# Patient Record
Sex: MALE | NOT HISPANIC OR LATINO | Employment: FULL TIME | URBAN - METROPOLITAN AREA
[De-identification: names, ages, dates, MRNs, and addresses within clinical notes are randomized per-mention and may not be internally consistent; named-entity substitution may affect disease eponyms.]

---

## 2022-05-23 ENCOUNTER — TRANSFERRED RECORDS (OUTPATIENT)
Dept: HEALTH INFORMATION MANAGEMENT | Facility: CLINIC | Age: 32
End: 2022-05-23

## 2023-04-10 ENCOUNTER — OFFICE VISIT (OUTPATIENT)
Dept: INTERNAL MEDICINE | Facility: CLINIC | Age: 33
End: 2023-04-10
Payer: COMMERCIAL

## 2023-04-10 VITALS
HEART RATE: 95 BPM | HEIGHT: 72 IN | SYSTOLIC BLOOD PRESSURE: 128 MMHG | OXYGEN SATURATION: 99 % | WEIGHT: 195.8 LBS | BODY MASS INDEX: 26.52 KG/M2 | DIASTOLIC BLOOD PRESSURE: 84 MMHG

## 2023-04-10 DIAGNOSIS — I10 PRIMARY HYPERTENSION: ICD-10-CM

## 2023-04-10 DIAGNOSIS — E78.5 DYSLIPIDEMIA: Primary | ICD-10-CM

## 2023-04-10 DIAGNOSIS — F17.200 NICOTINE DEPENDENCE, UNCOMPLICATED, UNSPECIFIED NICOTINE PRODUCT TYPE: ICD-10-CM

## 2023-04-10 PROBLEM — F90.9 ADHD (ATTENTION DEFICIT HYPERACTIVITY DISORDER): Status: ACTIVE | Noted: 2018-01-01

## 2023-04-10 PROCEDURE — 99204 OFFICE O/P NEW MOD 45 MIN: CPT | Performed by: INTERNAL MEDICINE

## 2023-04-10 RX ORDER — ROSUVASTATIN CALCIUM 10 MG/1
TABLET, COATED ORAL
COMMUNITY
Start: 2023-03-01 | End: 2023-04-10

## 2023-04-10 RX ORDER — AMLODIPINE BESYLATE AND ATORVASTATIN CALCIUM 10; 20 MG/1; MG/1
TABLET, FILM COATED ORAL
COMMUNITY
Start: 2023-03-01 | End: 2023-04-10

## 2023-04-10 RX ORDER — AMLODIPINE BESYLATE 10 MG/1
10 TABLET ORAL DAILY
Qty: 90 TABLET | Refills: 3 | Status: SHIPPED | OUTPATIENT
Start: 2023-04-10 | End: 2024-06-24

## 2023-04-10 RX ORDER — NICOTINE 21 MG/24HR
1 PATCH, TRANSDERMAL 24 HOURS TRANSDERMAL EVERY 24 HOURS
Qty: 42 PATCH | Refills: 0 | Status: SHIPPED | OUTPATIENT
Start: 2023-04-10 | End: 2023-05-22

## 2023-04-10 RX ORDER — VENLAFAXINE HYDROCHLORIDE 150 MG/1
150 CAPSULE, EXTENDED RELEASE ORAL DAILY
COMMUNITY
End: 2023-06-09

## 2023-04-10 RX ORDER — GUANFACINE 4 MG/1
TABLET, EXTENDED RELEASE ORAL
COMMUNITY
Start: 2023-01-01 | End: 2023-06-09

## 2023-04-10 RX ORDER — DEXTROAMPHETAMINE SACCHARATE, AMPHETAMINE ASPARTATE MONOHYDRATE, DEXTROAMPHETAMINE SULFATE AND AMPHETAMINE SULFATE 5; 5; 5; 5 MG/1; MG/1; MG/1; MG/1
CAPSULE, EXTENDED RELEASE ORAL
COMMUNITY
Start: 2023-01-01 | End: 2023-05-23 | Stop reason: ALTCHOICE

## 2023-04-10 RX ORDER — DEXTROAMPHETAMINE SACCHARATE, AMPHETAMINE ASPARTATE, DEXTROAMPHETAMINE SULFATE AND AMPHETAMINE SULFATE 2.5; 2.5; 2.5; 2.5 MG/1; MG/1; MG/1; MG/1
10 TABLET ORAL DAILY
COMMUNITY
Start: 2021-11-01 | End: 2023-05-23 | Stop reason: ALTCHOICE

## 2023-04-10 RX ORDER — NICOTINE 21 MG/24HR
1 PATCH, TRANSDERMAL 24 HOURS TRANSDERMAL EVERY 24 HOURS
Qty: 14 PATCH | Refills: 0 | Status: SHIPPED | OUTPATIENT
Start: 2023-05-22 | End: 2023-06-05

## 2023-04-10 RX ORDER — VENLAFAXINE HYDROCHLORIDE 37.5 MG/1
37.5 CAPSULE, EXTENDED RELEASE ORAL DAILY
COMMUNITY
End: 2023-06-09

## 2023-04-10 RX ORDER — AMLODIPINE BESYLATE 10 MG/1
10 TABLET ORAL DAILY
COMMUNITY
End: 2023-04-10

## 2023-04-10 RX ORDER — ROSUVASTATIN CALCIUM 10 MG/1
10 TABLET, COATED ORAL DAILY
Qty: 90 TABLET | Refills: 0 | Status: SHIPPED | OUTPATIENT
Start: 2023-04-10 | End: 2023-07-20

## 2023-04-10 ASSESSMENT — ENCOUNTER SYMPTOMS
INSOMNIA: 0
DEPRESSION: 1
NERVOUS/ANXIOUS: 0
DECREASED CONCENTRATION: 1
LIGHT-HEADEDNESS: 1
HYPERTENSION: 1
PANIC: 0

## 2023-04-10 NOTE — PATIENT INSTRUCTIONS
Nicotine Transdermal System   Habitrol, Nicoderm C-Q    Uses  For quitting smoking.    Instructions  DO NOT take this medicine by mouth.    Avoid placing the patch near the breast.    Remove the patch after 24 hours.    Keep the medicine at room temperature. Avoid heat and direct light.    This patch should not be cut.    Wash your hands before and after handling this medicine.    Remove old patch before applying new one. Change the location of the new patch.    If you have vivid dreams or trouble sleeping, you may remove the patch before going to sleep.    Ask your doctor or pharmacist about locations on your body where this patch can be used.    Remove the plastic liner that protects the sticky side of the patch before applying to the skin.    Be sure the area of skin is clean and dry before putting on a new patch.    Apply the patch to a clean, dry, hairless area.    Press the patch firmly for a few seconds to make sure it stays in place.    After removing the patch, fold it together and discard it out of reach of children and pets.    Please ask your doctor or pharmacist how you can safely dispose of used patches.    If the skin under the patch becomes irritated, remove the patch. Do not apply a new patch to the area until the skin feels better.    To avoid irritating your skin, use a different location for a new patch.    Apply the patch only to normal looking skin. Avoid areas of the skin that are red, have scrapes, or damaged.    If the patch falls off, apply a new a patch on a different location of the body.    Please tell your doctor and pharmacist about all the medicines you take. Include both prescription and over-the-counter medicines. Also tell them about any vitamins, herbal medicines, or anything else you take for your health.    If you need to stop this medicine, your doctor may wish to gradually reduce the dosage before stopping.    Do not use more than 1 patch at any one  time.    Cautions  Tell your doctor and pharmacist if you ever had an allergic reaction to a medicine. Symptoms of an allergic reaction can include trouble breathing, skin rash, itching, swelling, or severe dizziness.    Do not use the medication any more than instructed.    Avoid smoking while on this medicine. Smoking may increase your risk for stroke, heart attack, blood clots, high blood pressure, and other diseases of the heart and blood vessels.    Tell the doctor or pharmacist if you are pregnant, planning to be pregnant, or breastfeeding.    Ask your pharmacist if this medicine can interact with any of your other medicines. Be sure to tell them about all the medicines you take.    Please tell all your doctors and dentists that you are on this medicine before they provide care.    Side Effects  The following is a list of some common side effects from this medicine. Please speak with your doctor about what you should do if you experience these or other side effects.      skin irritation where medicine is applied    If you have any of the following side effects, you may be getting too much medicine. Please contact your doctor to let them know about these side effects.      diarrhea    dizziness    nausea    rapid heartbeat    vomiting    A few people may have an allergic reaction to this medicine. Symptoms can include difficulty breathing, skin rash, itching, swelling, or severe dizziness. If you notice any of these symptoms, seek medical help quickly.    Extra  Please speak with your doctor, nurse, or pharmacist if you have any questions about this medicine.      [email or web address]  IMPORTANT NOTE: This document tells you briefly how to take your medicine, but it does not tell you all there is to know about it. Your doctor or pharmacist may give you other documents about your medicine. Please talk to them if you have any questions. Always follow their advice. There is a more  complete description of this medicine available in English. Scan this code on your smartphone or tablet or use the web address below. You can also ask your pharmacist for a printout. If you have any questions, please ask your pharmacist.   2021 [employer].      1728-4988 [employer]. All rights reserved. This information is not intended as a substitute for professional medical care. Always follow your healthcare professional's instructions.

## 2023-04-10 NOTE — PROGRESS NOTES
Assessment & Plan     Dyslipidemia    Jono just started rosuvastatin a few weeks ago- outside records suggest a 20mg dose, but he says he is taking 10mg.  We'll recheck lipids in a couple of months.  His prior PCP had placed a referral to cardiology due to his chest pain (though negative stress test last year, and symptoms improved now), HTN, and dyslipidemia but he was not able to see cardiology yet so requests a referral in our system.     - Adult Cardiology Eval  Referral; Future  - Lipid panel reflex to direct LDL Fasting; Future  - rosuvastatin (CRESTOR) 10 MG tablet; Take 1 tablet (10 mg) by mouth daily    Primary hypertension    BP looks okay in clinic today, but he reports home BPs have been higher.  He hasn't had his home machine checked against a clinic machine, so RN BP check appointment scheduled so this can be done.  Continue current amlodipine 10mg.  He is also working with psychiatry to taper down on Adderall and venlafaxine; has an appointment with a new provider next week.     - Adult Cardiology Eval  Referral; Future  - amLODIPine (NORVASC) 10 MG tablet; Take 1 tablet (10 mg) by mouth daily    Nicotine dependence, uncomplicated, unspecified nicotine product type    He switched from cigarettes to vaping in 2017 and would like to try patches again to try to quit vaping.  Discussed pneumonia vaccine and he was interested in getting this, but unfortunately the clinic vaccine fridge is currently out of commission, so can see if he can get the vaccine at the RN appointment.     - [place] Quit Partner Referral; Future  - nicotine (NICODERM CQ) 21 MG/24HR 24 hr patch; Place 1 patch onto the skin every 24 hours for 42 days  - nicotine (NICODERM CQ) 14 MG/24HR 24 hr patch; Place 1 patch onto the skin every 24 hours for 14 days  - nicotine (NICODERM CQ) 7 MG/24HR 24 hr patch; Place 1 patch onto the skin every 24 hours for 14 days    He reports having more COVID vaccine doses than we have on  file, so asked him to upload info to [employer] if he finds it.     Hep B vaccines done in childhood.     HIV and Hep C screening declined- low risk.        Nicotine/Tobacco Cessation:  He reports that he has been smoking vaping device and cigarettes. He started smoking about 14 years ago. He has been smoking an average of 1 pack per day. He has never used smokeless tobacco.  Nicotine/Tobacco Cessation Plan:   Pharmacotherapies : Nicotine patch      Jarret Centeno MD  [facility] [place] [facility]   Jono is a 33 year old, presenting for the following health issues:  Establish Care (Hypertension, needs cardiology referral, medication review)    HPI     Jono moved to [place] from [place] about a year to be closer to his SO's family.   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  He works as .     He has recently been seen in primary care at [facility] and has been started on amlodipine for HTN (lisinopril also ordered but not started since BP was doing okay), rosuvastatin 20mg started for dyslipidemia per the chart but he reports he is taking 10mg (started this 2-3 weeks ago), and referral to cardiology was placed due to high LDL and chest pain (though negative stress test last year).  Jono reports his father has a history of cardiac arrhythmia, no other heart disease in his family.      Jono reports that home BPs actually tend to be higher than in the office.  Recent BPs at home been 140/90s or under.      He is following with psychiatry to reduce venlafaxine (currently on 187.5mg daily down from 225mg daily) and Adderall and is scheduled to see a new provider next week.      [place] XR 20mg filled on 3/11 for 30 tablets and Adderall IR 10mg filled on 3/17 for 30 tablets by Tarik Castanon, MARISA in [place].      Jono is currently vaping, changed to this from cigarettes.  He has used  "nicotine patches in the past which were helpful.          Review of Systems   Constitutional, CV, psych negative, except as otherwise noted.      Objective    /84 (BP Location: Right arm, Patient Position: Sitting, Cuff Size: Adult Regular)   Pulse 95   Ht 1.832 m (6' 0.13\")   Wt 88.8 kg (195 lb 12.8 oz)   SpO2 99%   BMI 26.46 kg/m    Body mass index is 26.46 kg/m .  Physical Exam   GENERAL: healthy, alert and no distress  RESP: lungs clear to auscultation - no rales, rhonchi or wheezes  CV: regular rate and rhythm, normal S1 S2, no S3 or S4, no murmur, click or rub, no peripheral edema and peripheral pulses strong  PSYCH: mentation appears normal, affect normal/bright               Answers for HPI/ROS submitted by the patient on 4/10/2023  General Symptoms: No  Skin Symptoms: No  HENT Symptoms: No  EYE SYMPTOMS: No  HEART SYMPTOMS: Yes  LUNG SYMPTOMS: No  INTESTINAL SYMPTOMS: No  URINARY SYMPTOMS: No  REPRODUCTIVE SYMPTOMS: No  SKELETAL SYMPTOMS: No  BLOOD SYMPTOMS: No  NERVOUS SYSTEM SYMPTOMS: No  MENTAL HEALTH SYMPTOMS: Yes  Chest pain or pressure: Yes  High blood pressure: Yes  Light-headedness: Yes  Nervous or Anxious: No  Depression: Yes  Trouble sleeping: No  Trouble thinking or concentrating: Yes  Mood changes: Yes  Panic attacks: No      "

## 2023-05-21 ENCOUNTER — HEALTH MAINTENANCE LETTER (OUTPATIENT)
Age: 33
End: 2023-05-21

## 2023-05-23 ENCOUNTER — VIRTUAL VISIT (OUTPATIENT)
Dept: FAMILY MEDICINE | Facility: CLINIC | Age: 33
End: 2023-05-23
Payer: COMMERCIAL

## 2023-05-23 ENCOUNTER — TELEPHONE (OUTPATIENT)
Dept: INTERNAL MEDICINE | Facility: CLINIC | Age: 33
End: 2023-05-23

## 2023-05-23 DIAGNOSIS — F90.9 ATTENTION DEFICIT HYPERACTIVITY DISORDER (ADHD), UNSPECIFIED ADHD TYPE: Primary | ICD-10-CM

## 2023-05-23 DIAGNOSIS — F33.1 MODERATE RECURRENT MAJOR DEPRESSION (H): ICD-10-CM

## 2023-05-23 PROCEDURE — 99214 OFFICE O/P EST MOD 30 MIN: CPT | Mod: 95 | Performed by: FAMILY MEDICINE

## 2023-05-23 PROCEDURE — 96127 BRIEF EMOTIONAL/BEHAV ASSMT: CPT | Performed by: FAMILY MEDICINE

## 2023-05-23 RX ORDER — LISDEXAMFETAMINE DIMESYLATE 30 MG/1
30 CAPSULE ORAL EVERY MORNING
Qty: 30 CAPSULE | Refills: 0 | Status: SHIPPED | OUTPATIENT
Start: 2023-05-23 | End: 2023-06-24

## 2023-05-23 RX ORDER — LISDEXAMFETAMINE DIMESYLATE 30 MG/1
1 CAPSULE ORAL EVERY MORNING
COMMUNITY
Start: 2021-07-19 | End: 2023-05-23

## 2023-05-23 RX ORDER — GUANFACINE 4 MG/1
TABLET, EXTENDED RELEASE ORAL
Status: CANCELLED | OUTPATIENT
Start: 2023-05-23

## 2023-05-23 ASSESSMENT — PATIENT HEALTH QUESTIONNAIRE - PHQ9
SUM OF ALL RESPONSES TO PHQ QUESTIONS 1-9: 10
SUM OF ALL RESPONSES TO PHQ QUESTIONS 1-9: 10
10. IF YOU CHECKED OFF ANY PROBLEMS, HOW DIFFICULT HAVE THESE PROBLEMS MADE IT FOR YOU TO DO YOUR WORK, TAKE CARE OF THINGS AT HOME, OR GET ALONG WITH OTHER PEOPLE: SOMEWHAT DIFFICULT

## 2023-05-23 NOTE — TELEPHONE ENCOUNTER
Woody Alberto     Pt is having trouble logging on.  He said he was called to come on at 4 pm but I do not see any phone message.  He is unable to get on now and I have central scheduling on the line with pt.  What should I tell him?    ALCIRA Rice

## 2023-05-23 NOTE — PROGRESS NOTES
"Jono is a 33 year old who is being evaluated via a billable video visit.      How would you like to obtain your AVS? MyChart  If the video visit is dropped, the invitation should be resent by: Text to cell phone: [phone]  Will anyone else be joining your video visit? No          Assessment & Plan     Attention deficit hyperactivity disorder (ADHD), unspecified ADHD type  Management discussed with patient, has done well with Vyvanse in the past, will send a new prescription at 30 mg daily, possible side effect discussed included blood pressure elevation, he will continue to monitor his blood pressure, he will follow-up with PCP to sign a controlled substance agreement, medication adjustment and long-term management.  - lisdexamfetamine (VYVANSE) 30 MG capsule; Take 1 capsule (30 mg) by mouth every morning    Moderate recurrent major depression (H)  Referral to psychotherapy, if unable to control depression could be a candidate for psychiatrist referral.  - Adult Mental Health  Referral; Future    Review of external notes as documented elsewhere in note  20 minutes spent by me on the date of the encounter doing chart review, review of outside records, review of test results, interpretation of tests, patient visit and documentation        BMI:   Estimated body mass index is 26.46 kg/m  as calculated from the following:    Height as of 4/10/23: 1.832 m (6' 0.13\").    Weight as of 4/10/23: 88.8 kg (195 lb 12.8 oz).   Weight management plan: Patient referred to endocrine and/or weight management specialty    Depression Screening Follow Up        5/23/2023     3:01 PM   PHQ   PHQ-9 Total Score 10   Q9: Thoughts of better off dead/self-harm past 2 weeks Several days   F/U: Thoughts of suicide or self-harm Yes   F/U: Self harm-plan No   F/U: Self-harm action No   F/U: Safety concerns No         5/23/2023     3:01 PM   Last PHQ-9   1.  Little interest or pleasure in doing things 2   2.  Feeling down, depressed, " or hopeless 3   3.  Trouble falling or staying asleep, or sleeping too much 1   4.  Feeling tired or having little energy 2   5.  Poor appetite or overeating 1   6.  Feeling bad about yourself 0   7.  Trouble concentrating 0   8.  Moving slowly or restless 0   Q9: Thoughts of better off dead/self-harm past 2 weeks 1   PHQ-9 Total Score 10   In the past two weeks have you had thoughts of suicide or self harm? Yes   Do you have concerns about your personal safety or the safety of others? No   In the past 2 weeks have you thought about a plan or had intention to harm yourself? No   In the past 2 weeks have you acted on these thoughts in any way? No              View : No data to display.                  Follow Up      Follow Up Actions Taken  Crisis resource information provided in the After Visit Summary  Mental Health Referral placed    Discussed the following ways the patient can remain in a safe environment:  be around others  Regular exercise    Trupti Alicea MD  [facility]    Alia De La Cruz is a 33 year old, presenting for the following health issues:  Recheck Medication and Refill Request (Patient stated that he is currently on Adderall. He would like to switch to Vyvanse. Needs refill for Vyvanse if possible.)         View : No data to display.              History of Present Illness       Mental Health Follow-up:  Patient presents to follow-up on Depression.Patient's depression since last visit has been:  Worse  The patient is not having other symptoms associated with depression.      Any significant life events: No and job concerns  Patient is not feeling anxious or having panic attacks.  Patient has no concerns about alcohol or drug use.    Reason for visit:  Switching  from adderall to vyvanse    He eats 2-3 servings of fruits and vegetables daily.He consumes 3 sweetened beverage(s) daily.He exercises with enough effort to increase his heart rate 10 to 19 minutes per day.  He  exercises with enough effort to increase his heart rate 4 days per week. He is missing 1 dose(s) of medications per week.  He is not taking prescribed medications regularly due to cost of medication and other.    Today's PHQ-9         PHQ-9 Total Score: 10    PHQ-9 Q9 Thoughts of better off dead/self-harm past 2 weeks :   Several days  Thoughts of suicide or self harm: (P) Yes  Self-harm Plan:   (P) No  Self-harm Action:     (P) No  Safety concerns for self or others: (P) No    How difficult have these problems made it for you to do your work, take care of things at home, or get along with other people: Somewhat difficult           New patient to me, history of ADHD, he saw a psychiatrist but he was not covered by insurance, he is returning to [facility] for management of his ADHD.  He is stating that he used to be on Vyvanse in the past, medication was switched to Adderall, currently doing XR 20 mg in the morning and 10 mg in the afternoon, is also taking Intuniv 4 mg daily in the morning, he has depression managed with Effexor Exar 150 mg in the morning, 37.5 mg good afternoon his PHQ-9 score is high today, but denies any suicidal plan.    PATIENT HEALTH QUESTIONNAIRE-9 (PHQ - 9)    Over the last 2 weeks, how often have you been bothered by any of the following problems?    1. Little interest or pleasure in doing things -  More than half the days   2. Feeling down, depressed, or hopeless -  Nearly every day   3. Trouble falling or staying asleep, or sleeping too much - Several days   4. Feeling tired or having little energy -  More than half the days   5. Poor appetite or overeating -  Several days   6. Feeling bad about yourself - or that you are a failure or have let yourself or your family down -  Not at all   7. Trouble concentrating on things, such as reading the newspaper or watching television - Not at all   8. Moving or speaking so slowly that other people could have noticed? Or the opposite - being  so fidgety or restless that you have been moving around a lot more than usual Not at all   9. Thoughts that you would be better off dead or of hurting  yourself in some way Several days   Total Score: 10     If you checked off any problems, how difficult have these problems made it for you to do your work, take care of things at home, or get along with other people?      Developed by Rian Wyatt, Nae Gibson, Tom Oneill and colleagues, with an educational martha from [employer] No permission required to reproduce, translate, display or distribute. permission required to reproduce, translate, display or distribute.  Review of Systems   Constitutional, HEENT, cardiovascular, pulmonary, gi and gu systems are negative, except as otherwise noted.      Objective           Vitals:  No vitals were obtained today due to virtual visit.    Physical Exam   GENERAL: Healthy, alert and no distress  EYES: Eyes grossly normal to inspection.  No discharge or erythema, or obvious scleral/conjunctival abnormalities.  RESP: No audible wheeze, cough, or visible cyanosis.  No visible retractions or increased work of breathing.    SKIN: Visible skin clear. No significant rash, abnormal pigmentation or lesions.  NEURO: Cranial nerves grossly intact.  Mentation and speech appropriate for age.  PSYCH: Mentation appears normal, affect normal/bright, judgement and insight intact, normal speech and appearance well-groomed.        This note was dictated using a voice recognition software.  Any grammatical or context distortion are unintentional and inherent to the software.             Video-Visit Details    Type of service:  Video Visit     Originating Location (pt. Location): Home    Distant Location (provider location):  On-site  Platform used for Video Visit: [employer]

## 2023-06-09 ENCOUNTER — OFFICE VISIT (OUTPATIENT)
Dept: FAMILY MEDICINE | Facility: CLINIC | Age: 33
End: 2023-06-09
Payer: COMMERCIAL

## 2023-06-09 VITALS
TEMPERATURE: 97.8 F | RESPIRATION RATE: 18 BRPM | HEIGHT: 72 IN | OXYGEN SATURATION: 98 % | WEIGHT: 194 LBS | HEART RATE: 91 BPM | DIASTOLIC BLOOD PRESSURE: 73 MMHG | BODY MASS INDEX: 26.28 KG/M2 | SYSTOLIC BLOOD PRESSURE: 124 MMHG

## 2023-06-09 DIAGNOSIS — F90.9 ATTENTION DEFICIT HYPERACTIVITY DISORDER (ADHD), UNSPECIFIED ADHD TYPE: Primary | ICD-10-CM

## 2023-06-09 DIAGNOSIS — F33.1 MODERATE RECURRENT MAJOR DEPRESSION (H): ICD-10-CM

## 2023-06-09 PROCEDURE — 99214 OFFICE O/P EST MOD 30 MIN: CPT | Performed by: STUDENT IN AN ORGANIZED HEALTH CARE EDUCATION/TRAINING PROGRAM

## 2023-06-09 RX ORDER — VENLAFAXINE HYDROCHLORIDE 37.5 MG/1
37.5 CAPSULE, EXTENDED RELEASE ORAL DAILY
Qty: 90 CAPSULE | Refills: 3 | Status: SHIPPED | OUTPATIENT
Start: 2023-06-09 | End: 2024-03-14

## 2023-06-09 RX ORDER — GUANFACINE 4 MG/1
4 TABLET, EXTENDED RELEASE ORAL AT BEDTIME
Qty: 90 TABLET | Refills: 3 | Status: SHIPPED | OUTPATIENT
Start: 2023-06-09 | End: 2024-06-19

## 2023-06-09 RX ORDER — VENLAFAXINE HYDROCHLORIDE 150 MG/1
150 CAPSULE, EXTENDED RELEASE ORAL DAILY
Qty: 90 CAPSULE | Refills: 3 | Status: SHIPPED | OUTPATIENT
Start: 2023-06-09 | End: 2024-05-24

## 2023-06-09 ASSESSMENT — PATIENT HEALTH QUESTIONNAIRE - PHQ9
10. IF YOU CHECKED OFF ANY PROBLEMS, HOW DIFFICULT HAVE THESE PROBLEMS MADE IT FOR YOU TO DO YOUR WORK, TAKE CARE OF THINGS AT HOME, OR GET ALONG WITH OTHER PEOPLE: SOMEWHAT DIFFICULT
SUM OF ALL RESPONSES TO PHQ QUESTIONS 1-9: 8
SUM OF ALL RESPONSES TO PHQ QUESTIONS 1-9: 8

## 2023-06-09 ASSESSMENT — ANXIETY QUESTIONNAIRES
IF YOU CHECKED OFF ANY PROBLEMS ON THIS QUESTIONNAIRE, HOW DIFFICULT HAVE THESE PROBLEMS MADE IT FOR YOU TO DO YOUR WORK, TAKE CARE OF THINGS AT HOME, OR GET ALONG WITH OTHER PEOPLE: SOMEWHAT DIFFICULT
7. FEELING AFRAID AS IF SOMETHING AWFUL MIGHT HAPPEN: NOT AT ALL
3. WORRYING TOO MUCH ABOUT DIFFERENT THINGS: SEVERAL DAYS
1. FEELING NERVOUS, ANXIOUS, OR ON EDGE: SEVERAL DAYS
5. BEING SO RESTLESS THAT IT IS HARD TO SIT STILL: NOT AT ALL
2. NOT BEING ABLE TO STOP OR CONTROL WORRYING: SEVERAL DAYS
8. IF YOU CHECKED OFF ANY PROBLEMS, HOW DIFFICULT HAVE THESE MADE IT FOR YOU TO DO YOUR WORK, TAKE CARE OF THINGS AT HOME, OR GET ALONG WITH OTHER PEOPLE?: SOMEWHAT DIFFICULT
4. TROUBLE RELAXING: NOT AT ALL
6. BECOMING EASILY ANNOYED OR IRRITABLE: SEVERAL DAYS
GAD7 TOTAL SCORE: 4
7. FEELING AFRAID AS IF SOMETHING AWFUL MIGHT HAPPEN: NOT AT ALL

## 2023-06-09 NOTE — PROGRESS NOTES
Attention deficit hyperactivity disorder (ADHD), unspecified ADHD type  APPT 5/23/23: Management discussed with patient, has done well with Vyvanse in the past, will send a new prescription at 30 mg daily, possible side effect discussed included blood pressure elevation, he will continue to monitor his blood pressure, he will follow-up with PCP to sign a controlled substance agreement, medication adjustment and long-term management.  - lisdexamfetamine (VYVANSE) 30 MG capsule; Take 1 capsule (30 mg) by mouth every morning  - reports that before he was vyvanse 50mg and adderal IM 10mg in there afternoon     Moderate recurrent major depression (H)  APPT 5/23/23: Referral to psychotherapy, if unable to control depression could be a candidate for psychiatrist referral.  - Adult Mental Health  Referral; Future  - On vemlafaxine 187.5mg        6/9/2023     2:48 PM   BOB-7 SCORE   Total Score 4 (minimal anxiety)   Total Score 4             5/23/2023     3:01 PM 6/9/2023     2:47 PM   PHQ   PHQ-9 Total Score 10 8   Q9: Thoughts of better off dead/self-harm past 2 weeks Several days Not at all   F/U: Thoughts of suicide or self-harm Yes    F/U: Self harm-plan No    F/U: Self-harm action No    F/U: Safety concerns No

## 2023-06-09 NOTE — PROGRESS NOTES
Assessment & Plan     Attention deficit hyperactivity disorder (ADHD), unspecified ADHD type  Recently prescribed Vyvanse 30 mg on 5/23/2023.  Patient reports that he was previously prescribed Vyvanse 50 mg in the morning and Adderall 10 mg in the afternoon.  Reports that he is doing okay with the Vyvanse 30 mg dose.  - Discussed with patient that if patient is doing well, it is preferable that he stays on a lower dose.  If he feels that this is started become a problem at work/school, could consider increasing.  Patient was agreeable.    Moderate recurrent major depression (H)  Chronic, well controlled.  Continue current management.  - venlafaxine (EFFEXOR XR) 150 MG 24 hr capsule  Dispense: 90 capsule; Refill: 3  - guanFACINE HCl (INTUNIV) 4 MG TB24  Dispense: 90 tablet; Refill: 3  - venlafaxine (EFFEXOR XR) 37.5 MG 24 hr capsule  Dispense: 90 capsule; Refill: 3      Review of external notes as documented elsewhere in note      Sandra Yap MD  [facility]    Alia De La Cruz is a 33 year old, presenting for the following health issues:  office visit ([facility] care, arranging ADHD and Depression medication.)        6/9/2023     3:00 PM   Additional Questions   Roomed by    Accompanied by self     History of Present Illness       Mental Health Follow-up:  Patient presents to follow-up on Depression.Patient's depression since last visit has been:  Worse  The patient is not having other symptoms associated with depression.      Any significant life events: job concerns and health concerns  Patient is not feeling anxious or having panic attacks.  Patient has no concerns about alcohol or drug use.    Hyperlipidemia:  He presents for follow up of hyperlipidemia.  He is taking medication to lower cholesterol. He is not having myalgia or other side effects to statin medications.    Hypertension: He presents for follow up of hypertension.  He does check blood pressure  regularly outside of the  clinic. Outside blood pressures have been over 140/90. He follows a low salt diet.     Reason for visit:  Find pcp who can prescribe adhd+depression meds    He eats 2-3 servings of fruits and vegetables daily.He consumes 2 sweetened beverage(s) daily.He exercises with enough effort to increase his heart rate 10 to 19 minutes per day.  He exercises with enough effort to increase his heart rate 6 days per week.   He is taking medications regularly.    Today's PHQ-9         PHQ-9 Total Score: 8    PHQ-9 Q9 Thoughts of better off dead/self-harm past 2 weeks :   Not at all    How difficult have these problems made it for you to do your work, take care of things at home, or get along with other people: Somewhat difficult  Today's BOB-7 Score: 4       Attention deficit hyperactivity disorder  APPT 5/23/23: Management discussed with patient, has done well with Vyvanse in the past, will send a new prescription at 30 mg daily, possible side effect discussed included blood pressure elevation, he will continue to monitor his blood pressure, he will follow-up with PCP to sign a controlled substance agreement, medication adjustment and long-term management.  - lisdexamfetamine (VYVANSE) 30 MG capsule; Take 1 capsule (30 mg) by mouth every morning  - reports that before he was vyvanse 50mg and adderal IM 10mg in there afternoon     Moderate recurrent major depression  APPT 5/23/23: Referral to psychotherapy, if unable to control depression could be a candidate for psychiatrist referral.  - Adult Mental Health  Referral; Future  - On vemlafaxine 187.5mg      Review of Systems   Constitutional: Negative for fever and chills.   Respiratory: Negative for cough or shortness of breath.    Cardiovascular: Negative for chest pain or chest pressure.   Gastrointestinal: Negative for nausea, vomiting, diarrhea or abdominal pain.        Objective    /73 (BP Location: Left arm, Patient Position: Sitting, Cuff Size: Adult  "Regular)   Pulse 91   Temp 97.8  F (36.6  C) (Temporal)   Resp 18   Ht 1.83 m (6' 0.05\")   Wt 88 kg (194 lb)   SpO2 98%   BMI 26.28 kg/m    Body mass index is 26.28 kg/m .  Physical Exam   General: Well developed, well nourished.  Skin:  Dry without rash.    Head:  Normocephalic-atraumatic.    Eye:  Normal conjunctivae.     Respiratory:  Normal respiratory effort.   Gastrointestinal:  Non-distended.    Musculoskeletal:  No deformity or edema.  Neurologic: No focal deficits.        Prior to immunization administration, verified patients identity using patient s name and date of birth. Please see Immunization Activity for additional information.     Screening Questionnaire for Adult Immunization    Are you sick today?   No   Do you have allergies to medications, food, a vaccine component or latex?   Yes   Have you ever had a serious reaction after receiving a vaccination?   No   Do you have a long-term health problem with heart, lung, kidney, or metabolic disease (e.g., diabetes), asthma, a blood disorder, no spleen, complement component deficiency, a cochlear implant, or a spinal fluid leak?  Are you on long-term aspirin therapy?   No   Do you have cancer, leukemia, HIV/AIDS, or any other immune system problem?   No   Do you have a parent, brother, or sister with an immune system problem?   No   In the past 3 months, have you taken medications that affect  your immune system, such as prednisone, other steroids, or anticancer drugs; drugs for the treatment of rheumatoid arthritis, Crohn s disease, or psoriasis; or have you had radiation treatments?   No   Have you had a seizure, or a brain or other nervous system problem?   No   During the past year, have you received a transfusion of blood or blood    products, or been given immune (gamma) globulin or antiviral drug?   No   For women: Are you pregnant or is there a chance you could become       pregnant during the next month?   No   Have you received any " vaccinations in the past 4 weeks?   No     Immunization questionnaire was positive for at least one answer.  Notified provider.      Injection of any vaccines given by Connor Tamayo MA. Patient instructed to remain in clinic for 15 minutes afterwards, and to report any adverse reactions.     Screening performed by Connor Tamayo MA on 6/9/2023 at 3:06 PM.

## 2023-06-16 ENCOUNTER — MYC MEDICAL ADVICE (OUTPATIENT)
Dept: FAMILY MEDICINE | Facility: CLINIC | Age: 33
End: 2023-06-16
Payer: COMMERCIAL

## 2023-06-24 ENCOUNTER — MYC REFILL (OUTPATIENT)
Dept: FAMILY MEDICINE | Facility: CLINIC | Age: 33
End: 2023-06-24
Payer: COMMERCIAL

## 2023-06-24 DIAGNOSIS — F90.9 ATTENTION DEFICIT HYPERACTIVITY DISORDER (ADHD), UNSPECIFIED ADHD TYPE: ICD-10-CM

## 2023-06-26 RX ORDER — LISDEXAMFETAMINE DIMESYLATE 30 MG/1
30 CAPSULE ORAL EVERY MORNING
Qty: 30 CAPSULE | Refills: 0 | Status: SHIPPED | OUTPATIENT
Start: 2023-06-26 | End: 2023-11-27 | Stop reason: DRUGHIGH

## 2023-06-26 NOTE — TELEPHONE ENCOUNTER
Routing refill request to provider for review/approval because:  Drug not on the [facility] refill protocol     Last Written Prescription Date:  5/23/2023  Last Fill Quantity: 30,  # refills: 0   Last office visit provider:  6/9/2023     Requested Prescriptions   Pending Prescriptions Disp Refills     lisdexamfetamine (VYVANSE) 30 MG capsule 30 capsule 0     Sig: Take 1 capsule (30 mg) by mouth every morning       There is no refill protocol information for this order          Joanna Koch RN 06/26/23 4:14 AM

## 2023-06-26 NOTE — TELEPHONE ENCOUNTER
Appears that patient has reestablished with Dr. Yap for ADHD; will forward request.    Thanks,  Jarret Centeno MD

## 2023-06-29 ENCOUNTER — VIRTUAL VISIT (OUTPATIENT)
Dept: FAMILY MEDICINE | Facility: CLINIC | Age: 33
End: 2023-06-29
Payer: COMMERCIAL

## 2023-06-29 DIAGNOSIS — F90.9 ATTENTION DEFICIT HYPERACTIVITY DISORDER (ADHD), UNSPECIFIED ADHD TYPE: Primary | ICD-10-CM

## 2023-06-29 PROCEDURE — 99213 OFFICE O/P EST LOW 20 MIN: CPT | Mod: VID | Performed by: STUDENT IN AN ORGANIZED HEALTH CARE EDUCATION/TRAINING PROGRAM

## 2023-06-29 RX ORDER — DEXTROAMPHETAMINE SACCHARATE, AMPHETAMINE ASPARTATE, DEXTROAMPHETAMINE SULFATE AND AMPHETAMINE SULFATE 1.25; 1.25; 1.25; 1.25 MG/1; MG/1; MG/1; MG/1
5 TABLET ORAL DAILY
Qty: 30 TABLET | Refills: 0 | Status: SHIPPED | OUTPATIENT
Start: 2023-06-29 | End: 2023-11-27 | Stop reason: DRUGHIGH

## 2023-06-29 NOTE — PROGRESS NOTES
Jono is a 33 year old who is being evaluated via a billable video visit.      How would you like to obtain your AVS? MyChart  If the video visit is dropped, the invitation should be resent by: Send to e-mail at: [email or web address]  Will anyone else be joining your video visit? No          Assessment & Plan     Attention deficit hyperactivity disorder (ADHD), unspecified ADHD type  Chronic, uncontrolled per patient.  Patient reports that work is going well, however he is still having difficulty getting things done at home.  Currently on Vyvanse 30 mg daily.  In the past patient reports that he was on Vyvanse 40 mg daily with Adderall 10 mg in the afternoon.  Like to go back to that regimen, however given that Adderall 30 mg was recently refilled, will add Adderall 5 mg in the afternoon for now.  - amphetamine-dextroamphetamine (ADDERALL) 5 MG tablet  Dispense: 30 tablet; Refill: 0  -At next refill, if patient still feels that he needs an increase in medications, will consider Vyvanse 40 mg in the morning and Adderall 10 mg in the afternoon.      Review of external notes as documented elsewhere in note      Sandra Yap MD  [facility]   Jono is a 33 year old, presenting for the following health issues:  Follow Up and Medication Refill (Would like to start vyvanse 40mg and adderrall 10mg/)        6/29/2023     5:10 PM   Additional Questions   Roomed by Ratna ZEPEDA     Medication Refill         Vyvanse 30 mg daily sent to 6/26/2023. Already picked up,, he would like to go back to his previous dosing of Vyvanse 40 mg daily and Adderall 10 mg in the afternoon.    Working, going well, but feels that hard for him to get things done.           Review of Systems   Constitutional: Negative for fever and chills.   Respiratory: Negative for cough or shortness of breath.    Cardiovascular: Negative for chest pain or chest pressure.   Gastrointestinal: Negative for nausea, vomiting, diarrhea  or abdominal pain.        Objective           Vitals:  No vitals were obtained today due to virtual visit.    Physical Exam   GENERAL: Healthy, alert and no distress  EYES: Eyes grossly normal to inspection.  No discharge or erythema, or obvious scleral/conjunctival abnormalities.  RESP: No audible wheeze, cough, or visible cyanosis.  No visible retractions or increased work of breathing.    SKIN: Visible skin clear. No significant rash, abnormal pigmentation or lesions.  NEURO: Cranial nerves grossly intact.  Mentation and speech appropriate for age.  PSYCH: Mentation appears normal, affect normal/bright, judgement and insight intact, normal speech and appearance well-groomed.            Video-Visit Details    Type of service:  Video Visit     Originating Location (pt. Location): Home    Distant Location (provider location):  On-site  Platform used for Video Visit: "[employer]

## 2023-06-29 NOTE — PROGRESS NOTES
Vyvanse 30 mg daily sent to 6/26/2023. -If already picked up, consider any Adderall 5 mg afternoon.  In next month increasing to Vyvanse 40 mg and Adderall 10 mg.

## 2023-07-14 ENCOUNTER — OFFICE VISIT (OUTPATIENT)
Dept: FAMILY MEDICINE | Facility: CLINIC | Age: 33
End: 2023-07-14
Payer: COMMERCIAL

## 2023-07-14 VITALS
OXYGEN SATURATION: 98 % | TEMPERATURE: 98 F | HEIGHT: 72 IN | HEART RATE: 94 BPM | BODY MASS INDEX: 25.73 KG/M2 | RESPIRATION RATE: 18 BRPM | WEIGHT: 190 LBS | SYSTOLIC BLOOD PRESSURE: 126 MMHG | DIASTOLIC BLOOD PRESSURE: 79 MMHG

## 2023-07-14 DIAGNOSIS — I10 PRIMARY HYPERTENSION: ICD-10-CM

## 2023-07-14 DIAGNOSIS — E78.5 DYSLIPIDEMIA: Primary | ICD-10-CM

## 2023-07-14 LAB
CHOLEST SERPL-MCNC: 134 MG/DL
HDLC SERPL-MCNC: 45 MG/DL
LDLC SERPL CALC-MCNC: 75 MG/DL
NONHDLC SERPL-MCNC: 89 MG/DL
TRIGL SERPL-MCNC: 71 MG/DL

## 2023-07-14 PROCEDURE — 36415 COLL VENOUS BLD VENIPUNCTURE: CPT | Performed by: STUDENT IN AN ORGANIZED HEALTH CARE EDUCATION/TRAINING PROGRAM

## 2023-07-14 PROCEDURE — 99395 PREV VISIT EST AGE 18-39: CPT | Performed by: STUDENT IN AN ORGANIZED HEALTH CARE EDUCATION/TRAINING PROGRAM

## 2023-07-14 PROCEDURE — 80061 LIPID PANEL: CPT | Performed by: STUDENT IN AN ORGANIZED HEALTH CARE EDUCATION/TRAINING PROGRAM

## 2023-07-14 ASSESSMENT — ENCOUNTER SYMPTOMS
ABDOMINAL PAIN: 0
SHORTNESS OF BREATH: 0
FREQUENCY: 0
CHILLS: 0
CONSTIPATION: 0
JOINT SWELLING: 0
MYALGIAS: 0
WEAKNESS: 0
HEMATOCHEZIA: 0
PALPITATIONS: 0
FEVER: 0
PARESTHESIAS: 0
DIARRHEA: 0
ARTHRALGIAS: 0
NERVOUS/ANXIOUS: 0
EYE PAIN: 0
SORE THROAT: 0
DIZZINESS: 0
HEMATURIA: 0
HEADACHES: 0
NAUSEA: 0
DYSURIA: 0
HEARTBURN: 0
COUGH: 0

## 2023-07-14 ASSESSMENT — PATIENT HEALTH QUESTIONNAIRE - PHQ9
SUM OF ALL RESPONSES TO PHQ QUESTIONS 1-9: 7
10. IF YOU CHECKED OFF ANY PROBLEMS, HOW DIFFICULT HAVE THESE PROBLEMS MADE IT FOR YOU TO DO YOUR WORK, TAKE CARE OF THINGS AT HOME, OR GET ALONG WITH OTHER PEOPLE: SOMEWHAT DIFFICULT
SUM OF ALL RESPONSES TO PHQ QUESTIONS 1-9: 7

## 2023-07-14 NOTE — PROGRESS NOTES
SUBJECTIVE:   CC: Jono is an 33 year old who presents for preventative health visit.       7/14/2023     2:55 PM   Additional Questions   Roomed by eh   Accompanied by self     Healthy Habits:     Getting at least 3 servings of Calcium per day:  Yes    Bi-annual eye exam:  NO    Dental care twice a year:  NO    Sleep apnea or symptoms of sleep apnea:  Daytime drowsiness    Diet:  Regular (no restrictions) and Low salt    Frequency of exercise:  2-3 days/week    Duration of exercise:  15-30 minutes    Taking medications regularly:  Yes    Medication side effects:  None    Additional concerns today:  No    HTN  - Pt reports that he was supposed to do a sleep test as work up for cause of secondary HTN. Does not snore. Has not been told he has apnea.   - US was normal  - not sure if blood was done.  - stress test was normal    ADHD  Chronic, uncontrolled per patient.  Patient reports that work is going well, however he is still having difficulty getting things done at home.  Currently on Vyvanse 30 mg daily.  In the past patient reports that he was on Vyvanse 40 mg daily with Adderall 10 mg in the afternoon.  Like to go back to that regimen      Today's PHQ-9 Score:       7/14/2023     2:37 PM   PHQ-9 SCORE   PHQ-9 Total Score MyChart 7 (Mild depression)   PHQ-9 Total Score 7           Have you ever done Advance Care Planning? (For example, a Health Directive, POLST, or a discussion with a medical provider or your loved ones about your wishes): Yes, advance care planning is on file.    Social History     Tobacco Use     Smoking status: Every Day     Types: Vaping Device     Start date: 1/1/2017     Smokeless tobacco: Never     Tobacco comments:     Quit cigarettes and changed to vaping in 2017   Substance Use Topics     Alcohol use: Not Currently             7/14/2023     2:39 PM   Alcohol Use   Prescreen: >3 drinks/day or >7 drinks/week? No       Last PSA: No results found for: PSA    Reviewed orders with patient.  "Reviewed health maintenance and updated orders accordingly - Yes  Lab work is in process  Labs reviewed in EPIC    Reviewed and updated as needed this visit by clinical staff   Tobacco  Allergies  Meds              Reviewed and updated as needed this visit by Provider                     Review of Systems   Constitutional: Negative for chills and fever.   HENT: Negative for congestion, ear pain, hearing loss and sore throat.    Eyes: Negative for pain and visual disturbance.   Respiratory: Negative for cough and shortness of breath.    Cardiovascular: Negative for chest pain, palpitations and peripheral edema.   Gastrointestinal: Negative for abdominal pain, constipation, diarrhea, heartburn, hematochezia and nausea.   Genitourinary: Negative for dysuria, frequency, genital sores, hematuria, impotence, penile discharge and urgency.   Musculoskeletal: Negative for arthralgias, joint swelling and myalgias.   Skin: Negative for rash.   Neurological: Negative for dizziness, weakness, headaches and paresthesias.   Psychiatric/Behavioral: Negative for mood changes. The patient is not nervous/anxious.          OBJECTIVE:   /79 (BP Location: Left arm, Patient Position: Sitting, Cuff Size: Adult Regular)   Pulse 94   Temp 98  F (36.7  C) (Temporal)   Resp 18   Ht 1.83 m (6' 0.05\")   Wt 86.2 kg (190 lb)   SpO2 98%   BMI 25.74 kg/m      Physical Exam  General Appearance:  Alert, cooperative, no distress, appears stated age.  Head:  Normocephalic, without obvious abnormality, atraumatic.  Eyes:  Conjunctivae/corneas clear, extraocular movements intact both eyes.  Lungs:  Clear to auscultation bilaterally, respirations unlabored.  Heart:  Regular rate and rhythm, S1 and S2 normal, no murmur, rub or gallop.  Abdomen:  Soft, non-tender, bowel sounds active all four quadrants.   Extremities:  Atraumatic, no cyanosis or edema.  Skin:  Skin color, texture, turgor normal, no rashes or lesions.  Neurologic: No focal " "deficits.      ASSESSMENT/PLAN:   {Diag Picklist:050577}    {Patient advised of split billing (Optional):829643}      COUNSELING:   {MALE COUNSELING MESSAGES:319074::\"Reviewed preventive health counseling, as reflected in patient instructions\"}    Health Maintenance:    STD Screening: declined    Immunizations: ***    HIV screening ages 15-65 (USPSTF guidelines, CDC guidelines are 13-64):    Advance Directive: *** not on file. Information provided 7/14/2023***.    Colorectal Cancer Screening - *** No family hx. Routine screening.     Hep C screening 18-79: ***        He reports that he has been smoking vaping device. He started smoking about 6 years ago. He has never used smokeless tobacco.  Nicotine/Tobacco Cessation Plan:   {Nicotine/Tobacco Cessation Plan:674119}      {Counseling Resources  [place] Preventive Services Task Force  Cholesterol Screening  Health diet/nutrition  Pooled Cohorts Equation Calculator  [employer]'s MyPlate  ASA Prophylaxis  Lung CA Screening  Osteoporosis prevention/bone health :000333}  {Prostate Cancer Screening  Consider for men 55-69 per guidance from USPSTF :989694}    Sandra Yap MD  [facility]  Prior to immunization administration, verified patients identity using patient s name and date of birth. Please see Immunization Activity for additional information.     Screening Questionnaire for Adult Immunization    Are you sick today?   No   Do you have allergies to medications, food, a vaccine component or latex?   Yes   Have you ever had a serious reaction after receiving a vaccination?   No   Do you have a long-term health problem with heart, lung, kidney, or metabolic disease (e.g., diabetes), asthma, a blood disorder, no spleen, complement component deficiency, a cochlear implant, or a spinal fluid leak?  Are you on long-term aspirin therapy?   No   Do you have cancer, leukemia, HIV/AIDS, or any other immune system problem?   No   Do you have a parent, brother, or " sister with an immune system problem?   No   In the past 3 months, have you taken medications that affect  your immune system, such as prednisone, other steroids, or anticancer drugs; drugs for the treatment of rheumatoid arthritis, Crohn s disease, or psoriasis; or have you had radiation treatments?   No   Have you had a seizure, or a brain or other nervous system problem?   No   During the past year, have you received a transfusion of blood or blood    products, or been given immune (gamma) globulin or antiviral drug?   No   For women: Are you pregnant or is there a chance you could become       pregnant during the next month?   No   Have you received any vaccinations in the past 4 weeks?   No     Immunization questionnaire was positive for at least one answer.  Notified provider.      Patient instructed to remain in clinic for 15 minutes afterwards, and to report any adverse reactions.     Screening performed by Connor Tamayo MA on 7/14/2023 at 2:58 PM.      Answers for HPI/ROS submitted by the patient on 7/14/2023  If you checked off any problems, how difficult have these problems made it for you to do your work, take care of things at home, or get along with other people?: Somewhat difficult  PHQ9 TOTAL SCORE: 7

## 2023-07-18 DIAGNOSIS — E78.5 DYSLIPIDEMIA: ICD-10-CM

## 2023-07-20 RX ORDER — ROSUVASTATIN CALCIUM 10 MG/1
10 TABLET, COATED ORAL DAILY
Qty: 90 TABLET | Refills: 3 | Status: SHIPPED | OUTPATIENT
Start: 2023-07-20 | End: 2024-08-29

## 2023-07-21 ASSESSMENT — ENCOUNTER SYMPTOMS
CONSTIPATION: 0
ARTHRALGIAS: 0
HEADACHES: 0
JOINT SWELLING: 0
SORE THROAT: 0
FEVER: 0
WEAKNESS: 0
DYSURIA: 0
NAUSEA: 0
SHORTNESS OF BREATH: 0
HEARTBURN: 0
NERVOUS/ANXIOUS: 0
DIZZINESS: 0
MYALGIAS: 0
HEMATOCHEZIA: 0
DIARRHEA: 0
HEMATURIA: 0
CHILLS: 0
COUGH: 0
FREQUENCY: 0
EYE PAIN: 0
ABDOMINAL PAIN: 0
PARESTHESIAS: 0
PALPITATIONS: 0

## 2023-07-21 NOTE — TELEPHONE ENCOUNTER
4/10/2023  [facility] Internal Medicine [place]   Jarret Centeno MD  Internal Medicine     [facility] 7/14/23

## 2023-07-21 NOTE — PROGRESS NOTES
Assessment & Plan     Dyslipidemia  Patient was recently started on statin for high cholesterol.  Prior PCP had planned for patient to recheck cholesterol levels, however patient had insurance problems.  We will recheck today.  - Lipid Profile (Chol, Trig, HDL, LDL calc)  - REVIEW OF HEALTH MAINTENANCE PROTOCOL ORDERS  - Lipid Profile (Chol, Trig, HDL, LDL calc)    Primary hypertension  Chronic, well controlled.  Continue current medication.      Review of external notes as documented elsewhere in note      Sandra Yap MD  [facility]    Alia De La Cruz is a 33 year old, presenting for the following health issues:  Physical (Follow up on med, )        7/14/2023     2:55 PM   Additional Questions   Roomed by    Accompanied by self     Healthy Habits:     Getting at least 3 servings of Calcium per day:  Yes    Bi-annual eye exam:  NO    Dental care twice a year:  NO    Sleep apnea or symptoms of sleep apnea:  Daytime drowsiness    Diet:  Regular (no restrictions) and Low salt    Frequency of exercise:  2-3 days/week    Duration of exercise:  15-30 minutes    Taking medications regularly:  Yes    Medication side effects:  None    Additional concerns today:  No           Review of Systems   Constitutional: Negative for chills and fever.   HENT: Negative for congestion, ear pain, hearing loss and sore throat.    Eyes: Negative for pain and visual disturbance.   Respiratory: Negative for cough and shortness of breath.    Cardiovascular: Negative for chest pain, palpitations and peripheral edema.   Gastrointestinal: Negative for abdominal pain, constipation, diarrhea, heartburn, hematochezia and nausea.   Genitourinary: Negative for dysuria, frequency, genital sores, hematuria, impotence, penile discharge and urgency.   Musculoskeletal: Negative for arthralgias, joint swelling and myalgias.   Skin: Negative for rash.   Neurological: Negative for dizziness, weakness, headaches and paresthesias.  "  Psychiatric/Behavioral: Negative for mood changes. The patient is not nervous/anxious.             Objective    /79 (BP Location: Left arm, Patient Position: Sitting, Cuff Size: Adult Regular)   Pulse 94   Temp 98  F (36.7  C) (Temporal)   Resp 18   Ht 1.83 m (6' 0.05\")   Wt 86.2 kg (190 lb)   SpO2 98%   BMI 25.74 kg/m    Body mass index is 25.74 kg/m .  Physical Exam   General: Well developed, well nourished.  Skin:  Dry without rash.    Head:  Normocephalic-atraumatic.    Eye:  Normal conjunctivae.     Respiratory:  Normal respiratory effort.   Gastrointestinal:  Non-distended.    Musculoskeletal:  No deformity or edema.  Neurologic: No focal deficits.              "

## 2023-07-24 ENCOUNTER — MYC MEDICAL ADVICE (OUTPATIENT)
Dept: FAMILY MEDICINE | Facility: CLINIC | Age: 33
End: 2023-07-24
Payer: COMMERCIAL

## 2023-07-24 DIAGNOSIS — F90.9 ATTENTION DEFICIT HYPERACTIVITY DISORDER (ADHD), UNSPECIFIED ADHD TYPE: Primary | ICD-10-CM

## 2023-07-24 RX ORDER — LISDEXAMFETAMINE DIMESYLATE 40 MG/1
40 CAPSULE ORAL EVERY MORNING
Qty: 30 CAPSULE | Refills: 0 | Status: SHIPPED | OUTPATIENT
Start: 2023-07-24 | End: 2023-08-27

## 2023-07-24 RX ORDER — DEXTROAMPHETAMINE SACCHARATE, AMPHETAMINE ASPARTATE, DEXTROAMPHETAMINE SULFATE AND AMPHETAMINE SULFATE 2.5; 2.5; 2.5; 2.5 MG/1; MG/1; MG/1; MG/1
10 TABLET ORAL 2 TIMES DAILY
Qty: 30 TABLET | Refills: 0 | Status: SHIPPED | OUTPATIENT
Start: 2023-07-24 | End: 2023-10-27

## 2023-07-24 RX ORDER — DEXTROAMPHETAMINE SACCHARATE, AMPHETAMINE ASPARTATE, DEXTROAMPHETAMINE SULFATE AND AMPHETAMINE SULFATE 1.25; 1.25; 1.25; 1.25 MG/1; MG/1; MG/1; MG/1
5 TABLET ORAL DAILY
Qty: 30 TABLET | Refills: 0 | Status: CANCELLED | OUTPATIENT
Start: 2023-07-24

## 2023-07-24 RX ORDER — LISDEXAMFETAMINE DIMESYLATE 30 MG/1
30 CAPSULE ORAL EVERY MORNING
Qty: 30 CAPSULE | Refills: 0 | Status: CANCELLED | OUTPATIENT
Start: 2023-07-24

## 2023-08-27 DIAGNOSIS — F90.9 ATTENTION DEFICIT HYPERACTIVITY DISORDER (ADHD), UNSPECIFIED ADHD TYPE: ICD-10-CM

## 2023-08-27 RX ORDER — LISDEXAMFETAMINE DIMESYLATE 40 MG/1
40 CAPSULE ORAL EVERY MORNING
Qty: 30 CAPSULE | Refills: 0 | Status: SHIPPED | OUTPATIENT
Start: 2023-08-27 | End: 2023-09-26

## 2023-09-19 ENCOUNTER — OFFICE VISIT (OUTPATIENT)
Dept: FAMILY MEDICINE | Facility: CLINIC | Age: 33
End: 2023-09-19
Payer: COMMERCIAL

## 2023-09-19 VITALS
BODY MASS INDEX: 26.28 KG/M2 | SYSTOLIC BLOOD PRESSURE: 118 MMHG | HEART RATE: 81 BPM | RESPIRATION RATE: 12 BRPM | HEIGHT: 72 IN | TEMPERATURE: 98.5 F | WEIGHT: 194 LBS | DIASTOLIC BLOOD PRESSURE: 74 MMHG | OXYGEN SATURATION: 98 %

## 2023-09-19 DIAGNOSIS — S40.022A ARM BRUISE, LEFT, INITIAL ENCOUNTER: Primary | ICD-10-CM

## 2023-09-19 DIAGNOSIS — M77.12 LATERAL EPICONDYLITIS OF LEFT ELBOW: ICD-10-CM

## 2023-09-19 DIAGNOSIS — M25.532 LEFT WRIST PAIN: ICD-10-CM

## 2023-09-19 DIAGNOSIS — R20.2 NUMBNESS AND TINGLING IN LEFT HAND: ICD-10-CM

## 2023-09-19 DIAGNOSIS — R20.0 NUMBNESS AND TINGLING IN LEFT HAND: ICD-10-CM

## 2023-09-19 PROBLEM — F32.A DEPRESSION: Status: ACTIVE | Noted: 2018-01-01

## 2023-09-19 PROBLEM — F11.21 OPIOID USE DISORDER, MODERATE, IN SUSTAINED REMISSION (H): Status: ACTIVE | Noted: 2022-08-23

## 2023-09-19 PROBLEM — E78.5 HYPERLIPIDEMIA: Status: ACTIVE | Noted: 2023-03-20

## 2023-09-19 PROCEDURE — 90686 IIV4 VACC NO PRSV 0.5 ML IM: CPT | Performed by: NURSE PRACTITIONER

## 2023-09-19 PROCEDURE — 90471 IMMUNIZATION ADMIN: CPT | Performed by: NURSE PRACTITIONER

## 2023-09-19 PROCEDURE — 99213 OFFICE O/P EST LOW 20 MIN: CPT | Mod: 25 | Performed by: NURSE PRACTITIONER

## 2023-09-19 ASSESSMENT — PATIENT HEALTH QUESTIONNAIRE - PHQ9
SUM OF ALL RESPONSES TO PHQ QUESTIONS 1-9: 11
10. IF YOU CHECKED OFF ANY PROBLEMS, HOW DIFFICULT HAVE THESE PROBLEMS MADE IT FOR YOU TO DO YOUR WORK, TAKE CARE OF THINGS AT HOME, OR GET ALONG WITH OTHER PEOPLE: SOMEWHAT DIFFICULT
SUM OF ALL RESPONSES TO PHQ QUESTIONS 1-9: 11

## 2023-09-19 ASSESSMENT — ENCOUNTER SYMPTOMS
PSYCHIATRIC NEGATIVE: 1
LIGHT-HEADEDNESS: 0
FACIAL ASYMMETRY: 0
MUSCULOSKELETAL NEGATIVE: 1
GASTROINTESTINAL NEGATIVE: 1
NUMBNESS: 1
ADENOPATHY: 0
BRUISES/BLEEDS EASILY: 1
HEADACHES: 0
COLOR CHANGE: 1
CONSTITUTIONAL NEGATIVE: 1

## 2023-09-19 NOTE — PROGRESS NOTES
Assessment & Plan     Numbness and tingling in left hand  *    Arm bruise, left, initial encounter  *    Left wrist pain      Lateral epicondylitis of left elbow    - carpal tunnel symptoms, this was discussed in depth with patient.  I went over different stretches he can incorporate, and I suggested that he continue to wear wrist splint.  I went over conservative management and warning signs of carpal tunnel with him today.  I discussed what carpal tunnel was and he verbalized understanding.   -Some inner type pain to left wrist, could be related to carpal tunnel, or arthritic type changes.  I went over different stretches he can partake, over-the-counter medications he can try, he can apply heat or ice to the area etc., he will continue to follow-up if needed.   -Tennis elbow was discussed in great detail.  We discussed conservative management today and he verbalized understanding.   -Bruising is noted to the inner arm of the forearm.  I am linking this to 2 a possible injury related to lifting 2 by fours this past weekend.  I discussed warning signs and when to seek additional care if symptoms do not improve or if they worsen.  He verbalized understanding.                  DENY Magallanes [facility] GOYO De La Cruz is a 33 year old, presenting for the following health issues:  Musculoskeletal Problem (Bruising of the left arm and pain in the wrist and arm, numbness in the arm and reduced feeling, pain started 6x months, bruising started 09/18/2023)      9/19/2023     3:22 PM   Additional Questions   Roomed by Fredis     - left forearm, inner aspect of arm, bruising noted recently. Also noted small bruise to left inner aspect of upper arm. He was carrying and moving 2x4's this past weekend. Not sure if this is related. He denied any bleeding disorders, or bleeding tendencies such as bleeding gums or nose. He did have surgery to left wrist over 10 years ago; stated  "related to a torn ligament. He is noticing pain that is a soreness \"to the core of the wrist\". He will notice pain to tennis elbow area as well and does tend to use his arms a lot for work and twists his wrists in different angles. He has noticed tingling and numbness especially to fingers such as pinky finger and at night will wake up with numbness in arm where he has to wake up shaking it.   - no other symptoms such as fever, chills, weight loss, rashes, or cysts. No HO DM.     History of Present Illness       Reason for visit:  Arm  Symptom onset:  More than a month  Symptoms include:  Numbness bruising  Symptom intensity:  Moderate  Symptom progression:  Worsening  Had these symptoms before:  Yes  Has tried/received treatment for these symptoms:  No  What makes it worse:  Filling out pointless forms    He eats 2-3 servings of fruits and vegetables daily.He consumes 3 sweetened beverage(s) daily.He exercises with enough effort to increase his heart rate 10 to 19 minutes per day.  He exercises with enough effort to increase his heart rate 3 or less days per week. He is missing 2 dose(s) of medications per week.  He is not taking prescribed medications regularly due to cost of medication and remembering to take.               Review of Systems   Constitutional: Negative.    Gastrointestinal: Negative.    Musculoskeletal: Negative.    Skin:  Positive for color change.   Neurological:  Positive for numbness. Negative for facial asymmetry, light-headedness and headaches.   Hematological:  Negative for adenopathy. Bruises/bleeds easily.   Psychiatric/Behavioral: Negative.              Objective    /74   Pulse 81   Temp 98.5  F (36.9  C) (Oral)   Resp 12   Ht 1.829 m (6')   Wt 88 kg (194 lb)   SpO2 98%   BMI 26.31 kg/m    There is no height or weight on file to calculate BMI.  Physical Exam  Vitals and nursing note reviewed.   Constitutional:       General: He is not in acute distress.     Appearance: " Normal appearance. He is normal weight. He is not ill-appearing, toxic-appearing or diaphoretic.   Cardiovascular:      Rate and Rhythm: Normal rate.   Pulmonary:      Effort: Pulmonary effort is normal.   Abdominal:      Palpations: Abdomen is soft.   Musculoskeletal:         General: No swelling.      Left shoulder: Normal.      Right upper arm: Normal.      Left upper arm: No swelling, edema, deformity, lacerations, tenderness or bony tenderness.      Right elbow: Normal.      Left elbow: Tenderness present in lateral epicondyle.      Right forearm: No swelling, edema, deformity, lacerations, tenderness or bony tenderness.      Left forearm: No swelling, edema, deformity, lacerations, tenderness or bony tenderness.      Left wrist: No swelling, deformity, effusion, lacerations or tenderness. Normal range of motion.      Right hand: No swelling, deformity or bony tenderness. Normal range of motion. Normal strength. Normal capillary refill.      Left hand: No swelling, deformity, lacerations, tenderness or bony tenderness. Normal range of motion. Normal strength. Normal sensation. There is no disruption of two-point discrimination. Normal capillary refill. Normal pulse.        Arms:       Cervical back: No swelling, deformity, erythema, signs of trauma or rigidity.      Thoracic back: No swelling, edema, deformity or lacerations.      Lumbar back: No swelling, edema, signs of trauma or lacerations.      Right lower leg: Normal. No edema.      Left lower leg: Normal. No edema.      Right ankle: Normal.      Left ankle: Normal.      Right foot: Normal.      Left foot: Normal.      Comments: Inner left wrist scar noted.    Skin:     General: Skin is warm.      Capillary Refill: Capillary refill takes less than 2 seconds.      Coloration: Skin is not jaundiced or pale.      Findings: Bruising present. No abrasion, abscess, acne, burn, erythema, signs of injury, laceration, lesion, petechiae, rash or wound. Rash is not  crusting, macular, nodular, papular, purpuric, pustular, scaling, urticarial or vesicular.      Nails: There is no clubbing.   Neurological:      General: No focal deficit present.      Mental Status: He is alert and oriented to person, place, and time.      Sensory: Sensation is intact.      Motor: Motor function is intact. No weakness, tremor or atrophy.      Coordination: Coordination is intact. Coordination normal.      Gait: Gait normal.      Comments: Positive left pinky finger tingling with tinel sign   Psychiatric:         Mood and Affect: Mood normal.         Behavior: Behavior normal.         Thought Content: Thought content normal.         Judgment: Judgment normal.

## 2023-09-24 DIAGNOSIS — F90.9 ATTENTION DEFICIT HYPERACTIVITY DISORDER (ADHD), UNSPECIFIED ADHD TYPE: ICD-10-CM

## 2023-09-25 NOTE — TELEPHONE ENCOUNTER
Looks like patient has most recently seen Dr. Sandra Yap for ADHD management.  I will forward med refill request.    Thanks,  Jarret Centeno MD

## 2023-09-26 RX ORDER — LISDEXAMFETAMINE DIMESYLATE 40 MG/1
40 CAPSULE ORAL EVERY MORNING
Qty: 30 CAPSULE | Refills: 0 | Status: SHIPPED | OUTPATIENT
Start: 2023-09-26 | End: 2023-10-27

## 2023-10-26 DIAGNOSIS — F90.9 ATTENTION DEFICIT HYPERACTIVITY DISORDER (ADHD), UNSPECIFIED ADHD TYPE: ICD-10-CM

## 2023-10-27 RX ORDER — LISDEXAMFETAMINE DIMESYLATE 40 MG/1
40 CAPSULE ORAL EVERY MORNING
Qty: 30 CAPSULE | Refills: 0 | Status: SHIPPED | OUTPATIENT
Start: 2023-10-27 | End: 2023-11-27

## 2023-10-27 RX ORDER — DEXTROAMPHETAMINE SACCHARATE, AMPHETAMINE ASPARTATE, DEXTROAMPHETAMINE SULFATE AND AMPHETAMINE SULFATE 2.5; 2.5; 2.5; 2.5 MG/1; MG/1; MG/1; MG/1
10 TABLET ORAL DAILY
Qty: 30 TABLET | Refills: 0 | Status: SHIPPED | OUTPATIENT
Start: 2023-10-27 | End: 2023-11-27

## 2023-11-26 DIAGNOSIS — F90.9 ATTENTION DEFICIT HYPERACTIVITY DISORDER (ADHD), UNSPECIFIED ADHD TYPE: ICD-10-CM

## 2023-11-27 RX ORDER — LISDEXAMFETAMINE DIMESYLATE 40 MG/1
40 CAPSULE ORAL EVERY MORNING
Qty: 30 CAPSULE | Refills: 0 | Status: SHIPPED | OUTPATIENT
Start: 2023-11-27 | End: 2023-12-25

## 2023-11-27 RX ORDER — DEXTROAMPHETAMINE SACCHARATE, AMPHETAMINE ASPARTATE, DEXTROAMPHETAMINE SULFATE AND AMPHETAMINE SULFATE 2.5; 2.5; 2.5; 2.5 MG/1; MG/1; MG/1; MG/1
10 TABLET ORAL DAILY
Qty: 30 TABLET | Refills: 0 | Status: SHIPPED | OUTPATIENT
Start: 2023-11-27 | End: 2023-12-25

## 2023-11-27 NOTE — TELEPHONE ENCOUNTER
Medication being prescribed by Dr. Yap.  I will forward refill request.      Thanks,  Jarret Centeno MD

## 2024-01-28 DIAGNOSIS — F90.9 ATTENTION DEFICIT HYPERACTIVITY DISORDER (ADHD), UNSPECIFIED ADHD TYPE: ICD-10-CM

## 2024-01-29 RX ORDER — DEXTROAMPHETAMINE SACCHARATE, AMPHETAMINE ASPARTATE, DEXTROAMPHETAMINE SULFATE AND AMPHETAMINE SULFATE 2.5; 2.5; 2.5; 2.5 MG/1; MG/1; MG/1; MG/1
10 TABLET ORAL DAILY
Qty: 30 TABLET | Refills: 0 | Status: SHIPPED | OUTPATIENT
Start: 2024-01-29 | End: 2024-03-04

## 2024-01-29 RX ORDER — LISDEXAMFETAMINE DIMESYLATE 40 MG/1
40 CAPSULE ORAL EVERY MORNING
Qty: 30 CAPSULE | Refills: 0 | Status: SHIPPED | OUTPATIENT
Start: 2024-01-29 | End: 2024-03-04

## 2024-01-29 NOTE — TELEPHONE ENCOUNTER
[place]  reviewed: meds last filled by Dr. Gibson on 12/26 (covering for me when I was out of office), but these meds have not ever been filled by myself.  Last visit for ADHD was with Dr. Yap in June.  I will forward to Dr. Yap for consideration of refill, but he may need a follow-up visit first.    Thanks,  Jarret Centeno MD    Health Care Proxy (HCP)

## 2024-03-04 DIAGNOSIS — F90.9 ATTENTION DEFICIT HYPERACTIVITY DISORDER (ADHD), UNSPECIFIED ADHD TYPE: ICD-10-CM

## 2024-03-04 RX ORDER — LISDEXAMFETAMINE DIMESYLATE 40 MG/1
40 CAPSULE ORAL EVERY MORNING
Qty: 30 CAPSULE | Refills: 0 | Status: SHIPPED | OUTPATIENT
Start: 2024-03-04 | End: 2024-04-04

## 2024-03-04 RX ORDER — DEXTROAMPHETAMINE SACCHARATE, AMPHETAMINE ASPARTATE, DEXTROAMPHETAMINE SULFATE AND AMPHETAMINE SULFATE 2.5; 2.5; 2.5; 2.5 MG/1; MG/1; MG/1; MG/1
10 TABLET ORAL DAILY
Qty: 30 TABLET | Refills: 0 | Status: SHIPPED | OUTPATIENT
Start: 2024-03-04 | End: 2024-04-04

## 2024-03-14 ENCOUNTER — VIRTUAL VISIT (OUTPATIENT)
Dept: FAMILY MEDICINE | Facility: CLINIC | Age: 34
End: 2024-03-14
Payer: COMMERCIAL

## 2024-03-14 DIAGNOSIS — I10 PRIMARY HYPERTENSION: ICD-10-CM

## 2024-03-14 DIAGNOSIS — F33.1 MODERATE RECURRENT MAJOR DEPRESSION (H): ICD-10-CM

## 2024-03-14 DIAGNOSIS — F90.9 ATTENTION DEFICIT HYPERACTIVITY DISORDER (ADHD), UNSPECIFIED ADHD TYPE: Primary | ICD-10-CM

## 2024-03-14 PROBLEM — F32.A DEPRESSION: Status: RESOLVED | Noted: 2018-01-01 | Resolved: 2024-03-14

## 2024-03-14 PROCEDURE — 99214 OFFICE O/P EST MOD 30 MIN: CPT | Mod: 95 | Performed by: STUDENT IN AN ORGANIZED HEALTH CARE EDUCATION/TRAINING PROGRAM

## 2024-03-14 RX ORDER — LISINOPRIL 5 MG/1
5 TABLET ORAL DAILY
Qty: 60 TABLET | Refills: 1 | Status: SHIPPED | OUTPATIENT
Start: 2024-03-14 | End: 2024-09-16

## 2024-03-14 ASSESSMENT — PATIENT HEALTH QUESTIONNAIRE - PHQ9
10. IF YOU CHECKED OFF ANY PROBLEMS, HOW DIFFICULT HAVE THESE PROBLEMS MADE IT FOR YOU TO DO YOUR WORK, TAKE CARE OF THINGS AT HOME, OR GET ALONG WITH OTHER PEOPLE: SOMEWHAT DIFFICULT
SUM OF ALL RESPONSES TO PHQ QUESTIONS 1-9: 12
SUM OF ALL RESPONSES TO PHQ QUESTIONS 1-9: 12

## 2024-03-14 NOTE — PROGRESS NOTES
"Jono is a 33 year old who is being evaluated via a billable video visit.    {ROOMING STAFF complete during rooming of virtual visit (Optional):834135}  {If patient encounters technical issues they should call [phone] :012880}    {PROVIDER CHARTING PREFERENCE:500254}    Subjective   Jono is a 33 year old, presenting for the following health issues:  Follow Up      3/14/2024     4:44 PM   Additional Questions   Roomed by Tia     History of Present Illness       Hypertension: He presents for follow up of hypertension.  He does check blood pressure  regularly outside of the clinic. Outside blood pressures have been over 140/90. He does not follow a low salt diet.     He eats 2-3 servings of fruits and vegetables daily.He consumes 2 sweetened beverage(s) daily.He exercises with enough effort to increase his heart rate 10 to 19 minutes per day.  He exercises with enough effort to increase his heart rate 3 or less days per week.   He is taking medications regularly.       {SUPERLIST (Optional):773191}  {additonal problems for provider to add (Optional):232473}    {ROS Picklists (Optional):167667}      Objective    Vitals - Patient Reported  Weight (Patient Reported): 88.5 kg (195 lb)  Height (Patient Reported): 182.9 cm (6')  BMI (Based on Pt Reported Ht/Wt): 26.45        Physical Exam   {video visit exam brief selected:306953}    {Diagnostic Test Results (Optional):136044}      Video-Visit Details    Type of service:  Video Visit   Originating Location (pt. Location): {video visit patient location:214708::\"Home\"}  {PROVIDER LOCATION On-site should be selected for visits conducted from your clinic location or adjoining [facility] hospital, academic office, or other nearby [facility] building. Off-site should be selected for all other provider locations, including home:152197}  Distant Location (provider location):  {virtual location provider:980007}  Platform used for Video Visit: {Virtual Visit " "Platforms:478028::\"Urvashi\"}  Signed Electronically by: Sandra Yap MD  {Email feedback regarding this note to [email or web address]   :304032}  "

## 2024-03-14 NOTE — PROGRESS NOTES
Jono is a 33 year old who is being evaluated via a billable video visit.        Jono was seen today for follow up.    Diagnoses and all orders for this visit:    Attention deficit hyperactivity disorder (ADHD), unspecified ADHD type  Comments:  Chronic, well-controlled.  Continue Vyvanse 40 mg in the morning, Adderall 10 mg in the afternoon.    Primary hypertension  Comments:  Chronic, uncontrolled.  Continue amlodipine 10 mg daily, add lisinopril 5 mg daily.  RTC in 2 weeks for recheck and BMP.  Orders:  -     lisinopril (ZESTRIL) 5 MG tablet; Take 1 tablet (5 mg) by mouth daily    Moderate recurrent major depression (H)  Comments:  Chronic, stable.  Patient self discontinued venlafaxine 37.5 mg, continued 150 mg daily.          Subjective   Jono is a 33 year old, presenting for the following health issues:  Follow Up        3/14/2024     4:44 PM   Additional Questions   Roomed by Tia     History of Present Illness       Hypertension: He presents for follow up of hypertension.  He does check blood pressure  regularly outside of the clinic. Outside blood pressures have been over 140/90. He does not follow a low salt diet.     He eats 2-3 servings of fruits and vegetables daily.He consumes 2 sweetened beverage(s) daily.He exercises with enough effort to increase his heart rate 10 to 19 minutes per day.  He exercises with enough effort to increase his heart rate 3 or less days per week.   He is taking medications regularly.           7/14/2023     2:37 PM 9/19/2023     2:44 PM 3/14/2024     4:39 PM   PHQ   PHQ-9 Total Score 7 11 12   Q9: Thoughts of better off dead/self-harm past 2 weeks Not at all Not at all Not at all   Denies SI or HI  Reduced venlafaxine a bit a few months ago  Stopped 37.5mg, now on 150mg      ADHD  Vyvanse 40 mg in the morning, Adderall 10 mg in the afternoon.  Doing well    Quit smoking and vaping  Was doing gum, but no more.     HTN  BP at home  One time 150/95, but most have been  140/95      Objective    Vitals - Patient Reported  Weight (Patient Reported): 88.5 kg (195 lb)  Height (Patient Reported): 182.9 cm (6')  BMI (Based on Pt Reported Ht/Wt): 26.45        Physical Exam   GENERAL: alert and no distress  EYES: Eyes grossly normal to inspection.  No discharge or erythema, or obvious scleral/conjunctival abnormalities.  RESP: No audible wheeze, cough, or visible cyanosis.    SKIN: Visible skin clear. No significant rash, abnormal pigmentation or lesions.  NEURO: Cranial nerves grossly intact.  Mentation and speech appropriate for age.  PSYCH: Appropriate affect, tone, and pace of words          Video-Visit Details    Type of service:  Video Visit   Originating Location (pt. Location): Home    Distant Location (provider location):  On-site  Platform used for Video Visit: Urvashi  Signed Electronically by: Sandra Yap MD

## 2024-04-04 DIAGNOSIS — F90.9 ATTENTION DEFICIT HYPERACTIVITY DISORDER (ADHD), UNSPECIFIED ADHD TYPE: ICD-10-CM

## 2024-04-04 RX ORDER — DEXTROAMPHETAMINE SACCHARATE, AMPHETAMINE ASPARTATE, DEXTROAMPHETAMINE SULFATE AND AMPHETAMINE SULFATE 2.5; 2.5; 2.5; 2.5 MG/1; MG/1; MG/1; MG/1
10 TABLET ORAL DAILY
Qty: 30 TABLET | Refills: 0 | Status: SHIPPED | OUTPATIENT
Start: 2024-04-04 | End: 2024-05-06

## 2024-04-04 RX ORDER — LISDEXAMFETAMINE DIMESYLATE 40 MG/1
40 CAPSULE ORAL EVERY MORNING
Qty: 30 CAPSULE | Refills: 0 | Status: SHIPPED | OUTPATIENT
Start: 2024-04-04 | End: 2024-05-06

## 2024-05-04 DIAGNOSIS — F90.9 ATTENTION DEFICIT HYPERACTIVITY DISORDER (ADHD), UNSPECIFIED ADHD TYPE: ICD-10-CM

## 2024-05-06 RX ORDER — DEXTROAMPHETAMINE SACCHARATE, AMPHETAMINE ASPARTATE, DEXTROAMPHETAMINE SULFATE AND AMPHETAMINE SULFATE 2.5; 2.5; 2.5; 2.5 MG/1; MG/1; MG/1; MG/1
10 TABLET ORAL DAILY
Qty: 30 TABLET | Refills: 0 | Status: SHIPPED | OUTPATIENT
Start: 2024-05-06 | End: 2024-06-07

## 2024-05-06 RX ORDER — LISDEXAMFETAMINE DIMESYLATE 40 MG/1
40 CAPSULE ORAL EVERY MORNING
Qty: 30 CAPSULE | Refills: 0 | Status: SHIPPED | OUTPATIENT
Start: 2024-05-06 | End: 2024-06-07

## 2024-05-23 DIAGNOSIS — F33.1 MODERATE RECURRENT MAJOR DEPRESSION (H): ICD-10-CM

## 2024-05-24 RX ORDER — VENLAFAXINE HYDROCHLORIDE 150 MG/1
150 CAPSULE, EXTENDED RELEASE ORAL DAILY
Qty: 90 CAPSULE | Refills: 3 | Status: SHIPPED | OUTPATIENT
Start: 2024-05-24

## 2024-05-30 ENCOUNTER — OFFICE VISIT (OUTPATIENT)
Dept: FAMILY MEDICINE | Facility: CLINIC | Age: 34
End: 2024-05-30
Payer: COMMERCIAL

## 2024-05-30 VITALS
WEIGHT: 199 LBS | TEMPERATURE: 97.9 F | DIASTOLIC BLOOD PRESSURE: 77 MMHG | OXYGEN SATURATION: 97 % | BODY MASS INDEX: 26.95 KG/M2 | RESPIRATION RATE: 16 BRPM | SYSTOLIC BLOOD PRESSURE: 128 MMHG | HEART RATE: 86 BPM | HEIGHT: 72 IN

## 2024-05-30 DIAGNOSIS — J32.0 CHRONIC MAXILLARY SINUSITIS: Primary | ICD-10-CM

## 2024-05-30 DIAGNOSIS — K05.219 ABSCESS OF UPPER GUM: ICD-10-CM

## 2024-05-30 PROCEDURE — 99213 OFFICE O/P EST LOW 20 MIN: CPT | Performed by: FAMILY MEDICINE

## 2024-05-30 PROCEDURE — G2211 COMPLEX E/M VISIT ADD ON: HCPCS | Performed by: FAMILY MEDICINE

## 2024-05-30 RX ORDER — FLUTICASONE PROPIONATE 50 MCG
1 SPRAY, SUSPENSION (ML) NASAL DAILY
Qty: 16 G | Refills: 2 | Status: SHIPPED | OUTPATIENT
Start: 2024-05-30

## 2024-05-30 ASSESSMENT — PATIENT HEALTH QUESTIONNAIRE - PHQ9
SUM OF ALL RESPONSES TO PHQ QUESTIONS 1-9: 6
10. IF YOU CHECKED OFF ANY PROBLEMS, HOW DIFFICULT HAVE THESE PROBLEMS MADE IT FOR YOU TO DO YOUR WORK, TAKE CARE OF THINGS AT HOME, OR GET ALONG WITH OTHER PEOPLE: SOMEWHAT DIFFICULT
SUM OF ALL RESPONSES TO PHQ QUESTIONS 1-9: 6

## 2024-05-30 NOTE — PROGRESS NOTES
Assessment & Plan       ICD-10-CM    1. Chronic maxillary sinusitis  J32.0 Adult ENT  Referral     amoxicillin-clavulanate (AUGMENTIN) 875-125 MG tablet     fluticasone (FLONASE) 50 MCG/ACT nasal spray      2. Abscess of upper gum  K05.219 Adult ENT  Referral     amoxicillin-clavulanate (AUGMENTIN) 875-125 MG tablet     fluticasone (FLONASE) 50 MCG/ACT nasal spray          The longitudinal plan of care for the diagnosis(es)/condition(s) as documented were addressed during this visit. Due to the added complexity in care, I will continue to support Jono in the subsequent management and with ongoing continuity of care.         BMI  Estimated body mass index is 26.99 kg/m  as calculated from the following:    Height as of this encounter: 1.829 m (6').    Weight as of this encounter: 90.3 kg (199 lb).   Weight management plan: Discussed healthy diet and exercise guidelines      There are no Patient Instructions on file for this visit.    Alia De La Cruz is a 34 year old, presenting for the following health issues:  Pain (Gums)        5/30/2024     7:10 AM   Additional Questions   Roomed by Hannah   Accompanied by none         5/30/2024     7:10 AM   Patient Reported Additional Medications   Patient reports taking the following new medications none     History of Present Illness       Reason for visit:  Abscess in gums  Symptom onset:  1-3 days ago  Symptoms include:  Pain and pus  Symptom intensity:  Mild  Symptom progression:  Staying the same  Had these symptoms before:  Yes  Has tried/received treatment for these symptoms:  No    He eats 2-3 servings of fruits and vegetables daily.He consumes 2 sweetened beverage(s) daily.He exercises with enough effort to increase his heart rate 30 to 60 minutes per day.  He exercises with enough effort to increase his heart rate 3 or less days per week. He is missing 1 dose(s) of medications per week.  He is not taking prescribed medications regularly due to  remembering to take.                 Review of Systems  Constitutional, HEENT, cardiovascular, pulmonary, gi and gu systems are negative, except as otherwise noted.      Objective    /77   Pulse 86   Temp 97.9  F (36.6  C) (Temporal)   Resp 16   Ht 1.829 m (6')   Wt 90.3 kg (199 lb)   SpO2 97%   BMI 26.99 kg/m    Body mass index is 26.99 kg/m .  Physical Exam  Constitutional:       General: He is not in acute distress.     Appearance: Normal appearance. He is well-developed. He is not ill-appearing.   HENT:      Head: Normocephalic and atraumatic.      Right Ear: External ear normal.      Left Ear: External ear normal.      Nose: Nose normal.      Comments: Left maxillary tenderness  Left upper gum abscess  Eyes:      General: No scleral icterus.     Extraocular Movements: Extraocular movements intact.      Conjunctiva/sclera: Conjunctivae normal.   Cardiovascular:      Rate and Rhythm: Normal rate.   Pulmonary:      Effort: Pulmonary effort is normal.   Musculoskeletal:      Cervical back: Normal range of motion and neck supple.   Skin:     General: Skin is warm and dry.   Neurological:      Mental Status: He is alert and oriented to person, place, and time.   Psychiatric:         Behavior: Behavior normal.         Thought Content: Thought content normal.         Judgment: Judgment normal.                    Signed Electronically by: Gilbert Lauren MD

## 2024-05-31 ENCOUNTER — TELEPHONE (OUTPATIENT)
Dept: OTOLARYNGOLOGY | Facility: CLINIC | Age: 34
End: 2024-05-31
Payer: COMMERCIAL

## 2024-05-31 NOTE — TELEPHONE ENCOUNTER
Patient confirmed scheduled appointment:  Date: 7/10  Time: 11:10am  Visit type: New Rhinology   Provider: Dr. Dougherty  Location: [facility]  Testing/imaging:   Additional notes:

## 2024-05-31 NOTE — TELEPHONE ENCOUNTER
"FUTURE VISIT INFORMATION      FUTURE VISIT INFORMATION:  Date: 7/10/24  Time: 11:10AM  [place] [facility] [place]  REFERRAL INFORMATION:  Referring provider:  Gilbert Beaulieu MD   Referring providers clinic:  [facility]  Reason for visit/diagnosis  sinusitis     RECORDS REQUESTED FROM:       Clinic name Comments Records Status Imaging Status   [facility] 24- ov Gilbert Beaulieu MD  [facility]  [address]- [address] Facial bones     [employer] trackin   24- pending req        2024 11:42 AM - Email a request to Mariam loera us imaging disc from [facility]- [facility]         \"Please notify/message [facility] if patient completed outside imaging prior to scheduled appointment and/or any outside records that might have been missed at pre visit -Thank you\"  "

## 2024-05-31 NOTE — TELEPHONE ENCOUNTER
[facility] Call Center    Phone Message    May a detailed message be left on voicemail: yes     Reason for Call: Other: Pt called to schedule appt from referral. Pt has gum abscess. Which provider would be best and how urgent? Please call pt back to assist with scheduling     Action Taken: Other: [facility]    Travel Screening: Not Applicable     Date of Service:

## 2024-06-07 DIAGNOSIS — F90.9 ATTENTION DEFICIT HYPERACTIVITY DISORDER (ADHD), UNSPECIFIED ADHD TYPE: ICD-10-CM

## 2024-06-07 RX ORDER — LISDEXAMFETAMINE DIMESYLATE 40 MG/1
40 CAPSULE ORAL EVERY MORNING
Qty: 30 CAPSULE | Refills: 0 | Status: SHIPPED | OUTPATIENT
Start: 2024-06-07 | End: 2024-07-15

## 2024-06-07 RX ORDER — DEXTROAMPHETAMINE SACCHARATE, AMPHETAMINE ASPARTATE, DEXTROAMPHETAMINE SULFATE AND AMPHETAMINE SULFATE 2.5; 2.5; 2.5; 2.5 MG/1; MG/1; MG/1; MG/1
10 TABLET ORAL DAILY
Qty: 30 TABLET | Refills: 0 | Status: SHIPPED | OUTPATIENT
Start: 2024-06-07 | End: 2024-07-15

## 2024-06-15 DIAGNOSIS — F33.1 MODERATE RECURRENT MAJOR DEPRESSION (H): ICD-10-CM

## 2024-06-17 RX ORDER — GUANFACINE 4 MG/1
4 TABLET, EXTENDED RELEASE ORAL AT BEDTIME
Qty: 90 TABLET | Refills: 0 | OUTPATIENT
Start: 2024-06-17

## 2024-06-19 ENCOUNTER — MYC REFILL (OUTPATIENT)
Dept: FAMILY MEDICINE | Facility: CLINIC | Age: 34
End: 2024-06-19
Payer: COMMERCIAL

## 2024-06-19 DIAGNOSIS — F33.1 MODERATE RECURRENT MAJOR DEPRESSION (H): ICD-10-CM

## 2024-06-20 RX ORDER — GUANFACINE 4 MG/1
4 TABLET, EXTENDED RELEASE ORAL AT BEDTIME
Qty: 90 TABLET | Refills: 0 | Status: SHIPPED | OUTPATIENT
Start: 2024-06-20 | End: 2024-09-30

## 2024-06-23 DIAGNOSIS — I10 PRIMARY HYPERTENSION: ICD-10-CM

## 2024-06-24 RX ORDER — AMLODIPINE BESYLATE 10 MG/1
10 TABLET ORAL DAILY
Qty: 90 TABLET | Refills: 0 | Status: SHIPPED | OUTPATIENT
Start: 2024-06-24

## 2024-07-10 ENCOUNTER — NURSE TRIAGE (OUTPATIENT)
Dept: INTERNAL MEDICINE | Facility: CLINIC | Age: 34
End: 2024-07-10

## 2024-07-10 ENCOUNTER — VIRTUAL VISIT (OUTPATIENT)
Dept: FAMILY MEDICINE | Facility: CLINIC | Age: 34
End: 2024-07-10
Payer: COMMERCIAL

## 2024-07-10 ENCOUNTER — MYC MEDICAL ADVICE (OUTPATIENT)
Dept: FAMILY MEDICINE | Facility: CLINIC | Age: 34
End: 2024-07-10

## 2024-07-10 ENCOUNTER — PRE VISIT (OUTPATIENT)
Dept: OTOLARYNGOLOGY | Facility: CLINIC | Age: 34
End: 2024-07-10

## 2024-07-10 DIAGNOSIS — U07.1 INFECTION DUE TO 2019 NOVEL CORONAVIRUS: Primary | ICD-10-CM

## 2024-07-10 DIAGNOSIS — U07.1 INFECTION DUE TO 2019 NOVEL CORONAVIRUS: ICD-10-CM

## 2024-07-10 PROCEDURE — 99213 OFFICE O/P EST LOW 20 MIN: CPT | Mod: 95 | Performed by: NURSE PRACTITIONER

## 2024-07-10 ASSESSMENT — PATIENT HEALTH QUESTIONNAIRE - PHQ9: SUM OF ALL RESPONSES TO PHQ QUESTIONS 1-9: 2

## 2024-07-10 NOTE — PATIENT INSTRUCTIONS
Discharge Instructions for COVID-19 Patients  You were tested for COVID-19. Your result was positive. This means you do have COVID-19. Follow the steps below. If you were tested for an upcoming treatment (procedure), you should also contact your care team for next steps.  How can I take care of myself at home?  Get lots of rest.  Drink extra fluids (unless a doctor has told you not to).  Take acetaminophen (Tylenol) for fever or pain. If you have liver or kidney problems, first ask your care team if it's safe to take acetaminophen.  Adults can take either:  650 mg (two 325 mg pills) every 4 to 6 hours as needed (but no more than 10 pills per day), OR   1,000 mg (two 500 mg pills) every 6 hours as needed (but no more than 6 pills per day).  Note: Don't take more than 3,000 mg of acetaminophen (Tylenol) in one day. Acetaminophen is found in many medicines, even over-the-counter medicines. Read all labels to be sure you don't take too much.  For children:  Check the acetaminophen (Tylenol) bottle to find out the right dose based on their age or weight.  Don't give children more than 1,625 mg of Tylenol in one day.  Know when to call 911. Emergency warning signs include:  Trouble breathing or shortness of breath  Pain or pressure in the chest that doesn't go away  Feeling confused like you haven't felt before, or not being able to wake up  Bluish-colored lips or face  If you have other health problems (like cancer, heart failure, an organ transplant, or severe kidney disease): Call your specialty clinic if you don't feel better in the next 2 days.  How can I protect others?  If you DO have symptoms:  Stay home and away from others (self-isolate). You can go back to being with other people when:  You've had no fever for 24 hours--without taking any medicine that reduces fever, AND  Your other symptoms (such as a cough) are better.  Wear a mask or face covering for 5 full days anytime you're around other people.  If you  DON'T have symptoms:  Wear a mask or face covering for 5 full days anytime you're around other people.  If you plan to visit a clinic or hospital, please check their guidelines before you arrive--healthcare sites may have different rules. If you were tested because you're going to have surgery or another treatment, contact your care team for next steps.  During self-isolation  Stay home until it's safe to be around others.  At home, stay away from other people and pets. Or, wear a well-fitting mask when you need to be around others.  Monitor your symptoms. If you have any emergency warning signs listed at the link (such as trouble breathing, chest pain that won't go away, or confusion that's new to you), then get emergency medical care right away.  Stay in a separate room from other household members, if possible.  Use a separate bathroom, if possible.  Improve ventilation (air flow) at home, if possible.  Don't share personal household items, like cups, towels, and utensils.  Is there medicine to treat COVID-19?  Yes, there are safe and effective medicines. They may make you feel better faster, keep you out of the hospital, and prevent death.   It's very important to take these medicines early in your illness before you get worse.   Who should take this medicine?   These treatments are for people who are not in the hospital, but who are at risk of getting very sick from COVID. This includes people who:  Are over age 65  Are members of the [place] community (Black, indigenous, and people of color)  Are overweight (body mass index is over 25)  Are inactive (don't exercise)  Are pregnant  Smoke or vape (now or in the past)  Have a disability  Have any of the following health problems: diabetes, high blood pressure, cancer, heart problems, liver disease, lung disease, kidney disease, sickle cell disease, cystic fibrosis (CF), dementia and other neuro (brain) diseases, HIV, thalassemia, or tuberculosis (TB)  Have ever had  "a stroke, organ transplant, or blood cell transplant  Have a mental health problem or substance abuse disorder (drugs, alcohol)  Have a weak immune system (are immuno-compromised)  COVID medicines can affect the safety of other medicines you take. It's important to talk to your care team before you take any new medicines. Sometimes you'll need to make short-term changes to your other medicines.  What should I do if I have symptoms now and want to discuss treatments?  Call your family clinic. Or, dial [phone] ([phone]) and say \"COVID\" when prompted, OR  Go to [email or web address] (click \"Message Your Care Team\"), OR  Request an appointment on [employer]  When can I go back to work?  You should NOT go back to work until you meet the guidelines on page 1. (See the \"How can I protect others?\" section.) You don't need to be re-tested for COVID before going back to work. Studies show that you won't spread the virus if it's been at least 10 days since your symptoms started (or 20 days if you have a weak immune system).  Employers, schools, and daycares: This document serves as formal notice of medical guidelines before your employee or student can return to work or school. They must meet the guidelines in the \"How can I protect others?\" section on page 1 before going back in person.   Where can I get more information?  [facility] - About COVID-19:   [email or web address]/[email or web address]    Preventing Spread of Respiratory Viruses when You're Sick: bit.ly/CDCVirus      For informational purposes only. Not to replace the advice of your health care provider. Clinically reviewed by Dr. Doc Salvador. Copyright   2020 [employer]. All rights reserved. [employer] [id number] - REV 04/24.    "

## 2024-07-10 NOTE — TELEPHONE ENCOUNTER
I called in Paxlovid  There is a serious interaction between Paxlovid and rosuvastatin/Crestor-she needs to have not taken rosuvastatin for 12 hours prior to starting Paxlovid and she needs to hold the rosuvastatin the whole time she is on it and up to 3 days after she completes the Paxlovid.    Obviously we recommend that patients with shortness of breath go to the emergency room-so if there is any worsening, she should not waste time.

## 2024-07-10 NOTE — PROGRESS NOTES
Jono is a 34 year old who is being evaluated via a billable video visit.    How would you like to obtain your AVS? MyChart  If the video visit is dropped, the invitation should be resent by: Text to cell phone: [phone]  Will anyone else be joining your video visit? No      Assessment & Plan     Infection due to 2019 novel coronavirus    - nirmatrelvir and ritonavir (PAXLOVID) 300 mg/100 mg therapy pack; Take 3 tablets by mouth 2 times daily for 5 days (Take 2 Nirmatrelvir tablets and 1 Ritonavir tablet twice daily for 5 days)    Discussed how to take the medication(s), expected outcomes, potential side effects.    Hold statin and follow further instructions from pharmacist.      See Patient Instructions  Patient Instructions   Discharge Instructions for COVID-19 Patients  You were tested for COVID-19. Your result was positive. This means you do have COVID-19. Follow the steps below. If you were tested for an upcoming treatment (procedure), you should also contact your care team for next steps.  How can I take care of myself at home?  Get lots of rest.  Drink extra fluids (unless a doctor has told you not to).  Take acetaminophen (Tylenol) for fever or pain. If you have liver or kidney problems, first ask your care team if it's safe to take acetaminophen.  Adults can take either:  650 mg (two 325 mg pills) every 4 to 6 hours as needed (but no more than 10 pills per day), OR   1,000 mg (two 500 mg pills) every 6 hours as needed (but no more than 6 pills per day).  Note: Don't take more than 3,000 mg of acetaminophen (Tylenol) in one day. Acetaminophen is found in many medicines, even over-the-counter medicines. Read all labels to be sure you don't take too much.  For children:  Check the acetaminophen (Tylenol) bottle to find out the right dose based on their age or weight.  Don't give children more than 1,625 mg of Tylenol in one day.  Know when to call 911. Emergency warning signs include:  Trouble breathing  or shortness of breath  Pain or pressure in the chest that doesn't go away  Feeling confused like you haven't felt before, or not being able to wake up  Bluish-colored lips or face  If you have other health problems (like cancer, heart failure, an organ transplant, or severe kidney disease): Call your specialty clinic if you don't feel better in the next 2 days.  How can I protect others?  If you DO have symptoms:  Stay home and away from others (self-isolate). You can go back to being with other people when:  You've had no fever for 24 hours--without taking any medicine that reduces fever, AND  Your other symptoms (such as a cough) are better.  Wear a mask or face covering for 5 full days anytime you're around other people.  If you DON'T have symptoms:  Wear a mask or face covering for 5 full days anytime you're around other people.  If you plan to visit a clinic or hospital, please check their guidelines before you arrive--healthcare sites may have different rules. If you were tested because you're going to have surgery or another treatment, contact your care team for next steps.  During self-isolation  Stay home until it's safe to be around others.  At home, stay away from other people and pets. Or, wear a well-fitting mask when you need to be around others.  Monitor your symptoms. If you have any emergency warning signs listed at the link (such as trouble breathing, chest pain that won't go away, or confusion that's new to you), then get emergency medical care right away.  Stay in a separate room from other household members, if possible.  Use a separate bathroom, if possible.  Improve ventilation (air flow) at home, if possible.  Don't share personal household items, like cups, towels, and utensils.  Is there medicine to treat COVID-19?  Yes, there are safe and effective medicines. They may make you feel better faster, keep you out of the hospital, and prevent death.   It's very important to take these  "medicines early in your illness before you get worse.   Who should take this medicine?   These treatments are for people who are not in the hospital, but who are at risk of getting very sick from COVID. This includes people who:  Are over age 65  Are members of the BIPOC community (Black, indigenous, and people of color)  Are overweight (body mass index is over 25)  Are inactive (don't exercise)  Are pregnant  Smoke or vape (now or in the past)  Have a disability  Have any of the following health problems: diabetes, high blood pressure, cancer, heart problems, liver disease, lung disease, kidney disease, sickle cell disease, cystic fibrosis (CF), dementia and other neuro (brain) diseases, HIV, thalassemia, or tuberculosis (TB)  Have ever had a stroke, organ transplant, or blood cell transplant  Have a mental health problem or substance abuse disorder (drugs, alcohol)  Have a weak immune system (are immuno-compromised)  COVID medicines can affect the safety of other medicines you take. It's important to talk to your care team before you take any new medicines. Sometimes you'll need to make short-term changes to your other medicines.  What should I do if I have symptoms now and want to discuss treatments?  Call your family clinic. Or, dial [phone] ([phone]) and say \"COVID\" when prompted, OR  Go to [email or web address] (click \"Message Your Care Team\"), OR  Request an appointment on [facility]  When can I go back to work?  You should NOT go back to work until you meet the guidelines on page 1. (See the \"How can I protect others?\" section.) You don't need to be re-tested for COVID before going back to work. Studies show that you won't spread the virus if it's been at least 10 days since your symptoms started (or 20 days if you have a weak immune system).  Employers, schools, and daycares: This document serves as formal notice of medical guidelines before your employee or student can return to work or " "school. They must meet the guidelines in the \"How can I protect others?\" section on page 1 before going back in person.   Where can I get more information?  [facility] - About COVID-19:   [email or web address]    Preventing Spread of Respiratory Viruses when You're Sick: [email or web address]      For informational purposes only. Not to replace the advice of your health care provider. Clinically reviewed by Dr. Doc Salvador. Copyright   2020 [facility] [place] [employer]. All rights reserved. [employer] [id number] - REV 04/24.      Alia De La Cruz is a 34 year old, presenting for the following health issues:  Covid Concern      7/10/2024     2:45 PM   Additional Questions   Roomed by [facility]       COVID-19 Symptom Review  How many days ago did these symptoms start? 3 days   positive yesterday     Are any of the following symptoms significant for you?  New or worsening difficulty breathing? Yes  Please describe what kind of difficulty you are having breathing:Mild dyspnea (able to do ADLs without difficulty, mild shortness of breath with activities such as climbing one or two flights of stairs or walking briskly)  Worsening cough? Yes, I am coughing up mucus.  Fever or chills? Yes, the highest temperature was 100.0  Headache: YES  Sore throat: YES  Chest pain: No  Diarrhea: No  Body aches? Yes     What treatments has patient tried? Sleeping    Does patient live in a nursing home, group home, or shelter? No  Does patient have a way to get food/medications during quarantined? Yes, I have a friend or family member who can help me.          States he's had COVID before and has taken Paxlovid before and wishes to take that again.        Review of Systems  Constitutional, HEENT, cardiovascular, pulmonary, gi and gu systems are negative, except as otherwise noted.      Objective           Vitals:  No vitals were obtained today due to virtual visit.    Physical Exam   GENERAL: alert and no distress  EYES: Eyes grossly " normal to inspection.  No discharge or erythema, or obvious scleral/conjunctival abnormalities.  RESP: No audible wheeze, cough, or visible cyanosis.    SKIN: Visible skin clear. No significant rash, abnormal pigmentation or lesions.  NEURO: Cranial nerves grossly intact.  Mentation and speech appropriate for age.  PSYCH: Appropriate affect, tone, and pace of words          Video-Visit Details    Type of service:  Video Visit   Originating Location (pt. Location): Home    Distant Location (provider location):  Off-site  Platform used for Video Visit: Nicol  Signed Electronically by: DENY Hernandez CNP

## 2024-07-10 NOTE — TELEPHONE ENCOUNTER
7/10/24 12:50pm     Received imaging disk from [facility] and took it to [facility] to process disk. SRubenO

## 2024-07-10 NOTE — TELEPHONE ENCOUNTER
RN COVID TREATMENT VISIT  07/10/24    The patient has been triaged and requires a higher level of care.  Patient declines ER and wants to schedule a virtual visit    Jono Gar  34 year old  Current weight? 200 #    Has the patient been seen by a primary care provider at an [facility] or [facility] within the past two years? Yes.   Have you been in close proximity to/do you have a known exposure to a person with a confirmed case of influenza? Yes. Patient informed a virtual visit with a provider will be required for treatment to determine if COVID or influenza medications are best. Patient will be scheduled or transferred to a  at the end of this call.     No appointments available in clinic today    GO TO ED NOW:   * You need to be seen in the Emergency Department.  * The patient has been triaged and requires a higher level of care.  Patient declines ER and wants to schedule a virtual visit. Transferred call to scheduling    Home Covid test + on 7/10/24  Symptoms started on 7/8/24  Mild - moderate SOB with wheezing  Sore throat , hoarseness, body aches and fatigue  Patient received Paxlovid 1 year ago for Covid symptoms    Message sent to [facility] Dr Chavis for review as Dr Yap out of clinic    Sailaja Mccloud RN  [facility]    Reason for Disposition   MODERATE difficulty breathing (e.g., speaks in phrases, SOB even at rest, pulse 100-120)    Additional Information   Negative: SEVERE difficulty breathing (e.g., struggling for each breath, speaks in single words)   Negative: Difficult to awaken or acting confused (e.g., disoriented, slurred speech)   Negative: Bluish (or gray) lips or face now   Negative: Shock suspected (e.g., cold/pale/clammy skin, too weak to stand, low BP, rapid pulse)   Negative: Sounds like a life-threatening emergency to the triager   Negative: Diagnosed or suspected COVID-19 and symptoms lasting 3 or more weeks   Negative:  "COVID-19 exposure and no symptoms   Negative: COVID-19 vaccine reaction suspected (e.g., fever, headache, muscle aches) occurring 1 to 3 days after getting vaccine   Negative: COVID-19 vaccine, questions about   Negative: Lives with someone known to have influenza (flu test positive) and flu-like symptoms (e.g., cough, runny nose, sore throat, SOB; with or without fever)   Negative: Possible COVID-19 symptoms and triager concerned about severity of symptoms or other causes   Negative: COVID-19 and breastfeeding, questions about   Negative: SEVERE or constant chest pain or pressure  (Exception: Mild central chest pain, present only when coughing.)    Answer Assessment - Initial Assessment Questions  1. COVID-19 DIAGNOSIS: \"How do you know that you have COVID?\" (e.g., positive lab test or self-test, diagnosed by doctor or NP/PA, symptoms after exposure).  Home Covid test + on 7/10/24  Symptoms started on 7/8/24  Mild - moderate SOB with wheezing  Sore throat , hoarseness, body aches and fatigue  Patient received Paxlovid 1 year ago for Covid symptoms    2. COVID-19 EXPOSURE: \"Was there any known exposure to COVID before the symptoms began?\" CDC Definition of close contact: within 6 feet (2 meters) for a total of 15 minutes or more over a 24-hour period.       Went to wedding in [place]    3. ONSET: \"When did the COVID-19 symptoms start?\"       7/10/24    4. WORST SYMPTOM: \"What is your worst symptom?\" (e.g., cough, fever, shortness of breath, muscle aches)        Mild - moderate SOB with wheezing    5. COUGH: \"Do you have a cough?\" If Yes, ask: \"How bad is the cough?\"        Coughing up clear / white mucous    6. FEVER: \"Do you have a fever?\" If Yes, ask: \"What is your temperature, how was it measured, and when did it start?\"      No Fever    7. RESPIRATORY STATUS: \"Describe your breathing?\" (e.g., normal; shortness of breath, wheezing, unable to speak)         Mild - moderate SOB with wheezing    8. " "BETTER-SAME-WORSE: \"Are you getting better, staying the same or getting worse compared to yesterday?\"  If getting worse, ask, \"In what way?\"      Getting worse    9. OTHER SYMPTOMS: \"Do you have any other symptoms?\"  (e.g., chills, fatigue, headache, loss of smell or taste, muscle pain, sore throat)        Sore throat , hoarseness, body aches and fatigue    10. HIGH RISK DISEASE: \"Do you have any chronic medical problems?\" (e.g., asthma, heart or lung disease, weak immune system, obesity, etc.)        HTN    11. VACCINE: \"Have you had the COVID-19 vaccine?\" If Yes, ask: \"Which one, how many shots, when did you get it?\"        4 vaccines  12. PREGNANCY: \"Is there any chance you are pregnant?\" \"When was your last menstrual period?\"        N/a    13. O2 SATURATION MONITOR:  \"Do you use an oxygen saturation monitor (pulse oximeter) at home?\" If Yes, ask \"What is your reading (oxygen level) today?\" \"What is your usual oxygen saturation reading?\" (e.g., 95%)        N/a    Protocols used: Coronavirus (COVID-19) Diagnosed or [id number]          "

## 2024-08-30 DIAGNOSIS — F90.9 ATTENTION DEFICIT HYPERACTIVITY DISORDER (ADHD), UNSPECIFIED ADHD TYPE: ICD-10-CM

## 2024-08-30 RX ORDER — DEXTROAMPHETAMINE SACCHARATE, AMPHETAMINE ASPARTATE, DEXTROAMPHETAMINE SULFATE AND AMPHETAMINE SULFATE 2.5; 2.5; 2.5; 2.5 MG/1; MG/1; MG/1; MG/1
10 TABLET ORAL DAILY
Qty: 30 TABLET | Refills: 0 | Status: SHIPPED | OUTPATIENT
Start: 2024-08-30

## 2024-09-09 ENCOUNTER — MYC REFILL (OUTPATIENT)
Dept: INTERNAL MEDICINE | Facility: CLINIC | Age: 34
End: 2024-09-09
Payer: COMMERCIAL

## 2024-09-09 DIAGNOSIS — E78.5 DYSLIPIDEMIA: ICD-10-CM

## 2024-09-09 DIAGNOSIS — F90.9 ATTENTION DEFICIT HYPERACTIVITY DISORDER (ADHD), UNSPECIFIED ADHD TYPE: ICD-10-CM

## 2024-09-09 RX ORDER — LISDEXAMFETAMINE DIMESYLATE 40 MG/1
40 CAPSULE ORAL EVERY MORNING
Qty: 30 CAPSULE | Refills: 0 | Status: SHIPPED | OUTPATIENT
Start: 2024-09-09

## 2024-09-12 RX ORDER — ROSUVASTATIN CALCIUM 10 MG/1
10 TABLET, COATED ORAL DAILY
Qty: 90 TABLET | Refills: 0 | OUTPATIENT
Start: 2024-09-12

## 2024-09-13 DIAGNOSIS — I10 PRIMARY HYPERTENSION: ICD-10-CM

## 2024-09-14 DIAGNOSIS — E78.5 DYSLIPIDEMIA: ICD-10-CM

## 2024-09-15 RX ORDER — ROSUVASTATIN CALCIUM 10 MG/1
10 TABLET, COATED ORAL DAILY
Qty: 90 TABLET | Refills: 0 | OUTPATIENT
Start: 2024-09-15

## 2024-09-16 RX ORDER — LISINOPRIL 5 MG/1
5 TABLET ORAL DAILY
Qty: 90 TABLET | Refills: 3 | Status: SHIPPED | OUTPATIENT
Start: 2024-09-16

## 2024-09-30 DIAGNOSIS — F33.1 MODERATE RECURRENT MAJOR DEPRESSION (H): ICD-10-CM

## 2024-09-30 RX ORDER — GUANFACINE 4 MG/1
4 TABLET, EXTENDED RELEASE ORAL AT BEDTIME
Qty: 90 TABLET | Refills: 3 | Status: SHIPPED | OUTPATIENT
Start: 2024-09-30

## 2024-10-06 ENCOUNTER — HEALTH MAINTENANCE LETTER (OUTPATIENT)
Age: 34
End: 2024-10-06

## 2024-10-10 DIAGNOSIS — F90.9 ATTENTION DEFICIT HYPERACTIVITY DISORDER (ADHD), UNSPECIFIED ADHD TYPE: ICD-10-CM

## 2024-10-12 RX ORDER — LISDEXAMFETAMINE DIMESYLATE 40 MG/1
40 CAPSULE ORAL EVERY MORNING
Qty: 30 CAPSULE | Refills: 0 | Status: SHIPPED | OUTPATIENT
Start: 2024-10-12

## 2024-10-12 RX ORDER — DEXTROAMPHETAMINE SACCHARATE, AMPHETAMINE ASPARTATE, DEXTROAMPHETAMINE SULFATE AND AMPHETAMINE SULFATE 2.5; 2.5; 2.5; 2.5 MG/1; MG/1; MG/1; MG/1
10 TABLET ORAL DAILY
Qty: 30 TABLET | Refills: 0 | Status: SHIPPED | OUTPATIENT
Start: 2024-10-12

## 2024-10-13 SDOH — HEALTH STABILITY: PHYSICAL HEALTH: ON AVERAGE, HOW MANY MINUTES DO YOU ENGAGE IN EXERCISE AT THIS LEVEL?: 20 MIN

## 2024-10-13 SDOH — HEALTH STABILITY: PHYSICAL HEALTH: ON AVERAGE, HOW MANY DAYS PER WEEK DO YOU ENGAGE IN MODERATE TO STRENUOUS EXERCISE (LIKE A BRISK WALK)?: 2 DAYS

## 2024-10-13 ASSESSMENT — SOCIAL DETERMINANTS OF HEALTH (SDOH): HOW OFTEN DO YOU GET TOGETHER WITH FRIENDS OR RELATIVES?: NEVER

## 2024-10-14 ENCOUNTER — OFFICE VISIT (OUTPATIENT)
Dept: FAMILY MEDICINE | Facility: CLINIC | Age: 34
End: 2024-10-14
Payer: COMMERCIAL

## 2024-10-14 VITALS
HEIGHT: 72 IN | RESPIRATION RATE: 16 BRPM | SYSTOLIC BLOOD PRESSURE: 129 MMHG | BODY MASS INDEX: 27.77 KG/M2 | WEIGHT: 205 LBS | HEART RATE: 59 BPM | TEMPERATURE: 97.9 F | DIASTOLIC BLOOD PRESSURE: 87 MMHG | OXYGEN SATURATION: 99 %

## 2024-10-14 DIAGNOSIS — Z13.21 ENCOUNTER FOR VITAMIN DEFICIENCY SCREENING: ICD-10-CM

## 2024-10-14 DIAGNOSIS — F90.9 ATTENTION DEFICIT HYPERACTIVITY DISORDER (ADHD), UNSPECIFIED ADHD TYPE: ICD-10-CM

## 2024-10-14 DIAGNOSIS — F32.5 DEPRESSION, MAJOR, IN REMISSION (H): ICD-10-CM

## 2024-10-14 DIAGNOSIS — R74.8 ELEVATED LIVER ENZYMES: Primary | ICD-10-CM

## 2024-10-14 DIAGNOSIS — Z23 NEED FOR COVID-19 VACCINE: ICD-10-CM

## 2024-10-14 DIAGNOSIS — E78.2 MODERATE MIXED HYPERLIPIDEMIA NOT REQUIRING STATIN THERAPY: ICD-10-CM

## 2024-10-14 DIAGNOSIS — E78.5 DYSLIPIDEMIA: ICD-10-CM

## 2024-10-14 DIAGNOSIS — Z23 NEED FOR HEPATITIS A VACCINATION: ICD-10-CM

## 2024-10-14 DIAGNOSIS — Z72.89 ENGAGES IN VAPING: ICD-10-CM

## 2024-10-14 DIAGNOSIS — Z23 NEEDS FLU SHOT: ICD-10-CM

## 2024-10-14 DIAGNOSIS — R93.0 ABNORMAL CT SCAN, SINUS: ICD-10-CM

## 2024-10-14 DIAGNOSIS — Z13.1 SCREENING FOR DIABETES MELLITUS: ICD-10-CM

## 2024-10-14 DIAGNOSIS — I10 HYPERTENSION, UNSPECIFIED TYPE: ICD-10-CM

## 2024-10-14 DIAGNOSIS — Z00.00 ROUTINE GENERAL MEDICAL EXAMINATION AT A HEALTH CARE FACILITY: ICD-10-CM

## 2024-10-14 DIAGNOSIS — F11.21 OPIOID USE DISORDER, MODERATE, IN SUSTAINED REMISSION (H): ICD-10-CM

## 2024-10-14 PROBLEM — R73.03 PREDIABETES: Status: ACTIVE | Noted: 2024-10-14

## 2024-10-14 LAB
AMPHETAMINES UR QL SCN: ABNORMAL
BARBITURATES UR QL SCN: ABNORMAL
BENZODIAZ UR QL SCN: ABNORMAL
BZE UR QL SCN: ABNORMAL
CANNABINOIDS UR QL SCN: ABNORMAL
EST. AVERAGE GLUCOSE BLD GHB EST-MCNC: 117 MG/DL
FENTANYL UR QL: ABNORMAL
HBA1C MFR BLD: 5.7 % (ref 0–5.6)
OPIATES UR QL SCN: ABNORMAL
PCP QUAL URINE (ROCHE): ABNORMAL

## 2024-10-14 PROCEDURE — 90656 IIV3 VACC NO PRSV 0.5 ML IM: CPT | Performed by: STUDENT IN AN ORGANIZED HEALTH CARE EDUCATION/TRAINING PROGRAM

## 2024-10-14 PROCEDURE — 99214 OFFICE O/P EST MOD 30 MIN: CPT | Mod: 25 | Performed by: STUDENT IN AN ORGANIZED HEALTH CARE EDUCATION/TRAINING PROGRAM

## 2024-10-14 PROCEDURE — 91320 SARSCV2 VAC 30MCG TRS-SUC IM: CPT | Performed by: STUDENT IN AN ORGANIZED HEALTH CARE EDUCATION/TRAINING PROGRAM

## 2024-10-14 PROCEDURE — 82306 VITAMIN D 25 HYDROXY: CPT | Performed by: STUDENT IN AN ORGANIZED HEALTH CARE EDUCATION/TRAINING PROGRAM

## 2024-10-14 PROCEDURE — 80053 COMPREHEN METABOLIC PANEL: CPT | Performed by: STUDENT IN AN ORGANIZED HEALTH CARE EDUCATION/TRAINING PROGRAM

## 2024-10-14 PROCEDURE — 99395 PREV VISIT EST AGE 18-39: CPT | Mod: 57 | Performed by: STUDENT IN AN ORGANIZED HEALTH CARE EDUCATION/TRAINING PROGRAM

## 2024-10-14 PROCEDURE — 90480 ADMN SARSCOV2 VAC 1/ONLY CMP: CPT | Performed by: STUDENT IN AN ORGANIZED HEALTH CARE EDUCATION/TRAINING PROGRAM

## 2024-10-14 PROCEDURE — 80061 LIPID PANEL: CPT | Performed by: STUDENT IN AN ORGANIZED HEALTH CARE EDUCATION/TRAINING PROGRAM

## 2024-10-14 PROCEDURE — 90632 HEPA VACCINE ADULT IM: CPT | Performed by: STUDENT IN AN ORGANIZED HEALTH CARE EDUCATION/TRAINING PROGRAM

## 2024-10-14 PROCEDURE — 83036 HEMOGLOBIN GLYCOSYLATED A1C: CPT | Performed by: STUDENT IN AN ORGANIZED HEALTH CARE EDUCATION/TRAINING PROGRAM

## 2024-10-14 PROCEDURE — 90472 IMMUNIZATION ADMIN EACH ADD: CPT | Performed by: STUDENT IN AN ORGANIZED HEALTH CARE EDUCATION/TRAINING PROGRAM

## 2024-10-14 PROCEDURE — 80307 DRUG TEST PRSMV CHEM ANLYZR: CPT | Performed by: STUDENT IN AN ORGANIZED HEALTH CARE EDUCATION/TRAINING PROGRAM

## 2024-10-14 PROCEDURE — 90471 IMMUNIZATION ADMIN: CPT | Performed by: STUDENT IN AN ORGANIZED HEALTH CARE EDUCATION/TRAINING PROGRAM

## 2024-10-14 PROCEDURE — 36415 COLL VENOUS BLD VENIPUNCTURE: CPT | Performed by: STUDENT IN AN ORGANIZED HEALTH CARE EDUCATION/TRAINING PROGRAM

## 2024-10-14 RX ORDER — GUANFACINE 4 MG/1
4 TABLET, EXTENDED RELEASE ORAL AT BEDTIME
Qty: 90 TABLET | Refills: 3 | Status: CANCELLED | OUTPATIENT
Start: 2024-10-14

## 2024-10-14 RX ORDER — ROSUVASTATIN CALCIUM 10 MG/1
10 TABLET, COATED ORAL DAILY
Qty: 90 TABLET | Refills: 3 | Status: SHIPPED | OUTPATIENT
Start: 2024-10-14

## 2024-10-14 RX ORDER — ROSUVASTATIN CALCIUM 10 MG/1
10 TABLET, COATED ORAL DAILY
Qty: 90 TABLET | Refills: 0 | Status: SHIPPED | OUTPATIENT
Start: 2024-10-14 | End: 2024-10-14

## 2024-10-14 RX ORDER — BUPRENORPHINE HYDROCHLORIDE AND NALOXONE HYDROCHLORIDE DIHYDRATE 8; 2 MG/1; MG/1
TABLET SUBLINGUAL
COMMUNITY
Start: 2024-09-25

## 2024-10-14 RX ORDER — DEXTROAMPHETAMINE SACCHARATE, AMPHETAMINE ASPARTATE, DEXTROAMPHETAMINE SULFATE AND AMPHETAMINE SULFATE 2.5; 2.5; 2.5; 2.5 MG/1; MG/1; MG/1; MG/1
10 TABLET ORAL DAILY
Qty: 30 TABLET | Refills: 0 | Status: CANCELLED | OUTPATIENT
Start: 2024-10-14

## 2024-10-14 NOTE — PROGRESS NOTES
Preventive Care Visit  [facility]  Sandra Yap MD, Family Medicine  Oct 14, 2024      De La Cruz was seen today for physical.    Diagnoses and all orders for this visit:    Routine general medical examination at a health care facility  -     REVIEW OF HEALTH MAINTENANCE PROTOCOL ORDERS  -     PRIMARY CARE FOLLOW-UP SCHEDULING; Future    Attention deficit hyperactivity disorder (ADHD), unspecified ADHD type  Chronic, well-controlled.  Continue current medications.  Check urine drug screen today.  -     Urine Drug Screen; Future  -     Urine Drug Screen    Moderate major depression remission  Continue to monitor, check vitamin D today.  -     Vitamin D Deficiency; Future  -     Vitamin D Deficiency    Hypertension, unspecified type  Chronic, well-controlled.  Continue current management  -     Comprehensive metabolic panel (BMP + Alb, Alk Phos, ALT, AST, Total. Bili, TP); Future  -     Comprehensive metabolic panel (BMP + Alb, Alk Phos, ALT, AST, Total. Bili, TP)    Moderate mixed hyperlipidemia not requiring statin therapy  Chronic, stable.  Recheck today.  -     Lipid panel reflex to direct LDL Non-fasting; Future  -     Lipid panel reflex to direct LDL Non-fasting  -     rosuvastatin (CRESTOR) 10 MG tablet; Take 1 tablet (10 mg) by mouth daily.    Opioid use disorder, moderate, in sustained remission (H)  Follows with outside provider.  On Suboxone will obtain JANNY.    Engages in vaping  Comments:  Quit smoking in 2017. Now vapes.    Need for hepatitis A vaccination  -     HEPATITIS A 19+ (HAVRIX/VAQTA)    Needs flu shot  -     INFLUENZA VACCINE,SPLIT VIRUS,TRIVALENT,PF(FLUZONE)    Need for COVID-19 vaccine  -     COVID-19 12+ (PFIZER)    Screening for diabetes mellitus  -     Hemoglobin A1c; Future  -     Hemoglobin A1c    Encounter for vitamin deficiency screening  -     Vitamin D Deficiency; Future  -     Vitamin D Deficiency    Abnormal CT scan, sinus  Patient reports chronic tenderness to  palpation of area around left maxillary/sphenoid sinus.  Abnormal CT scan of sinuses as noted below.  Patient declined referral to ENT at this time.  Low suspicion for infection at this time.  Continue to monitor.        Alia De La Cruz is a 34 year old, presenting for the following:  Physical        10/14/2024     1:14 PM   Additional Questions   Roomed by YEMI SHERIDAN   Accompanied by SELF        Health Care Directive  Patient does not have a Health Care Directive or Living Will: Discussed advance care planning with patient; however, patient declined at this time.    HPI      Tenderness on the left sinuses since 2021  Treated 6 months with antibiotics  Got better not completely gone away  Flonase was prescribed, didn't seem to help.   Referred to ENT, but lost insurance.     CT facial bones 1/9/21:  Impression  Mucosal thickening in the left maxillary sinus, without air-fluid level. Small mucous retention cysts in the right posterior ethmoid air cell. Frontal sinuses are clear. Small mucous retention cyst in the hypoplastic left sphenoid sinus. Mucosal apposition in the bilateral sphenoethmoidal recesses. Mucosal apposition in the left maxillary infundibulum. Right maxillary infundibulum is patent. Anterior ethmoidal arteries are not exposed.     Left buccal and inferior periorbital soft tissue swelling without drainable fluid collection. Small periapical lucency surrounding the first left maxillary premolar with small area of cortical thinning/breakthrough which may represent a source of soft tissue infection.    ADHD  Adderall and Vyvanse ordered 10/12/2024.  Not picked up yet.    Opioid Use Disorder  On suboxone  Pt reports that he gets it through [employer].   Pt reports that he has h/o opioid use disorder.     Pt is working at [employer] now. Trying to get a new job.   Work is part time, going well.     PHQ-2 Score:         3/14/2024     4:39 PM 5/30/2024     6:53 AM 7/10/2024     2:49 PM   PHQ   PHQ-9 Total Score  12 6 2   Q9: Thoughts of better off dead/self-harm past 2 weeks Not at all Not at all Not at all           10/13/2024   General Health   How would you rate your overall physical health? (!) FAIR   Feel stress (tense, anxious, or unable to sleep) Rather much      (!) STRESS CONCERN      10/13/2024   Nutrition   Three or more servings of calcium each day? (!) NO   Diet: Regular (no restrictions)   How many servings of fruit and vegetables per day? (!) 2-3   How many sweetened beverages each day? (!) 2            10/13/2024   Exercise   Days per week of moderate/strenous exercise 2 days   Average minutes spent exercising at this level 20 min      (!) EXERCISE CONCERN      10/13/2024   Social Factors   Frequency of gathering with friends or relatives Never   Worry food won't last until get money to buy more Yes   Food not last or not have enough money for food? Yes   Do you have housing? (Housing is defined as stable permanent housing and does not include staying ouside in a car, in a tent, in an abandoned building, in an overnight shelter, or couch-surfing.) Yes   Are you worried about losing your housing? No   Lack of transportation? Yes   Unable to get utilities (heat,electricity)? No      (!) FOOD SECURITY CONCERN PRESENT (!) TRANSPORTATION CONCERN PRESENT(!) SOCIAL CONNECTIONS CONCERN      10/13/2024   Dental   Dentist two times every year? (!) NO            10/13/2024   TB Screening   Were you born outside of the [place]? No                10/13/2024   Substance Use   Alcohol more than 3/day or more than 7/wk No   Do you use any other substances recreationally? (!) DECLINE        Social History     Tobacco Use    Smoking status: Every Day     Current packs/day: 0.00     Types: Other, Cigarettes     Start date: 2007     Last attempt to quit: 10/1/2016     Years since quittin.0     Passive exposure: Never    Smokeless tobacco: Never    Tobacco comments:     Quit cigarettes and changed to vaping in 2017   Vaping  "Use    Vaping status: Former   Substance Use Topics    Alcohol use: Not Currently    Drug use: Yes     Frequency: 3.0 times per week     Types: Marijuana, Opiates         10/13/2024   STI Screening   New sexual partner(s) since last STI/HIV test? No            10/13/2024   Contraception/Family Planning   Questions about contraception or family planning No          Reviewed and updated as needed this visit by Provider      Problems                  Objective    Exam  /87 (BP Location: Right arm, Patient Position: Sitting, Cuff Size: Adult Regular)   Pulse 59   Temp 97.9  F (36.6  C) (Oral)   Resp 16   Ht 1.82 m (5' 11.65\")   Wt 93 kg (205 lb)   SpO2 99%   BMI 28.07 kg/m     Estimated body mass index is 28.07 kg/m  as calculated from the following:    Height as of this encounter: 1.82 m (5' 11.65\").    Weight as of this encounter: 93 kg (205 lb).    Physical Exam  General Appearance:  Alert, cooperative, no distress, appears stated age.  Head:  Normocephalic, without obvious abnormality, atraumatic.  Eyes:  Conjunctivae/corneas clear, extraocular movements intact both eyes.  Lungs:  Clear to auscultation bilaterally, respirations unlabored.  Heart:  Regular rate and rhythm, S1 and S2 normal, no murmur, rub or gallop.  Abdomen:  Soft, non-tender, bowel sounds active all four quadrants.   Extremities:  Atraumatic, no cyanosis or edema.  Skin:  Skin color, texture, turgor normal, no rashes or lesions.  Neurologic: No focal deficits.          Signed Electronically by: Sandra Yap MD Prior to immunization administration, verified patients identity using patient s name and date of birth. Please see Immunization Activity for additional information.     Screening Questionnaire for Adult Immunization    Are you sick today?   No   Do you have allergies to medications, food, a vaccine component or latex?   Yes   Have you ever had a serious reaction after receiving a vaccination?   No   Do you have a long-term health " problem with heart, lung, kidney, or metabolic disease (e.g., diabetes), asthma, a blood disorder, no spleen, complement component deficiency, a cochlear implant, or a spinal fluid leak?  Are you on long-term aspirin therapy?   No   Do you have cancer, leukemia, HIV/AIDS, or any other immune system problem?   No   Do you have a parent, brother, or sister with an immune system problem?   No   In the past 3 months, have you taken medications that affect  your immune system, such as prednisone, other steroids, or anticancer drugs; drugs for the treatment of rheumatoid arthritis, Crohn s disease, or psoriasis; or have you had radiation treatments?   No   Have you had a seizure, or a brain or other nervous system problem?   No   During the past year, have you received a transfusion of blood or blood    products, or been given immune (gamma) globulin or antiviral drug?   Yes   For women: Are you pregnant or is there a chance you could become       pregnant during the next month?   No   Have you received any vaccinations in the past 4 weeks?   No     Immunization questionnaire was positive for at least one answer.  Notified       Patient instructed to remain in clinic for 15 minutes afterwards, and to report any adverse reactions.     Screening performed by Jason Parekh MA on 10/14/2024 at 1:17 PM.

## 2024-10-14 NOTE — PATIENT INSTRUCTIONS
Patient Education   Preventive Care Advice   This is general advice given by our system to help you stay healthy. However, your care team may have specific advice just for you. Please talk to your care team about your preventive care needs.  Nutrition  Eat 5 or more servings of fruits and vegetables each day.  Try wheat bread, brown rice and whole grain pasta (instead of white bread, rice, and pasta).  Get enough calcium and vitamin D. Check the label on foods and aim for 100% of the RDA (recommended daily allowance).  Lifestyle  Exercise at least 150 minutes each week  (30 minutes a day, 5 days a week).  Do muscle strengthening activities 2 days a week. These help control your weight and prevent disease.  No smoking.  Wear sunscreen to prevent skin cancer.  Have a dental exam and cleaning every 6 months.  Yearly exams  See your health care team every year to talk about:  Any changes in your health.  Any medicines your care team has prescribed.  Preventive care, family planning, and ways to prevent chronic diseases.  Shots (vaccines)   HPV shots (up to age 26), if you've never had them before.  Hepatitis B shots (up to age 59), if you've never had them before.  COVID-19 shot: Get this shot when it's due.  Flu shot: Get a flu shot every year.  Tetanus shot: Get a tetanus shot every 10 years.  Pneumococcal, hepatitis A, and RSV shots: Ask your care team if you need these based on your risk.  Shingles shot (for age 50 and up)  General health tests  Diabetes screening:  Starting at age 35, Get screened for diabetes at least every 3 years.  If you are younger than age 35, ask your care team if you should be screened for diabetes.  Cholesterol test: At age 39, start having a cholesterol test every 5 years, or more often if advised.  Bone density scan (DEXA): At age 50, ask your care team if you should have this scan for osteoporosis (brittle bones).  Hepatitis C: Get tested at least once in your life.  STIs (sexually  transmitted infections)  Before age 24: Ask your care team if you should be screened for STIs.  After age 24: Get screened for STIs if you're at risk. You are at risk for STIs (including HIV) if:  You are sexually active with more than one person.  You don't use condoms every time.  You or a partner was diagnosed with a sexually transmitted infection.  If you are at risk for HIV, ask about PrEP medicine to prevent HIV.  Get tested for HIV at least once in your life, whether you are at risk for HIV or not.  Cancer screening tests  Cervical cancer screening: If you have a cervix, begin getting regular cervical cancer screening tests starting at age 21.  Breast cancer scan (mammogram): If you've ever had breasts, begin having regular mammograms starting at age 40. This is a scan to check for breast cancer.  Colon cancer screening: It is important to start screening for colon cancer at age 45.  Have a colonoscopy test every 10 years (or more often if you're at risk) Or, ask your provider about stool tests like a FIT test every year or Cologuard test every 3 years.  To learn more about your testing options, visit:   .  For help making a decision, visit:   https://bit.ly/sq23668.  Prostate cancer screening test: If you have a prostate, ask your care team if a prostate cancer screening test (PSA) at age 55 is right for you.  Lung cancer screening: If you are a current or former smoker ages 50 to 80, ask your care team if ongoing lung cancer screenings are right for you.  For informational purposes only. Not to replace the advice of your health care provider. Copyright   2023 [facility]. All rights reserved. Clinically reviewed by the [facility]. AXADO 417921 - REV 01/24.  Learning About Stress  What is stress?     Stress is your body's response to a hard situation. Your body can have a physical, emotional, or mental response. Stress is a fact of life for most people, and it  affects everyone differently. What causes stress for you may not be stressful for someone else.  A lot of things can cause stress. You may feel stress when you go on a job interview, take a test, or run a race. This kind of short-term stress is normal and even useful. It can help you if you need to work hard or react quickly. For example, stress can help you finish an important job on time.  Long-term stress is caused by ongoing stressful situations or events. Examples of long-term stress include long-term health problems, ongoing problems at work, or conflicts in your family. Long-term stress can harm your health.  How does stress affect your health?  When you are stressed, your body responds as though you are in danger. It makes hormones that speed up your heart, make you breathe faster, and give you a burst of energy. This is called the fight-or-flight stress response. If the stress is over quickly, your body goes back to normal and no harm is done.  But if stress happens too often or lasts too long, it can have bad effects. Long-term stress can make you more likely to get sick, and it can make symptoms of some diseases worse. If you tense up when you are stressed, you may develop neck, shoulder, or low back pain. Stress is linked to high blood pressure and heart disease.  Stress also harms your emotional health. It can make you delgado, tense, or depressed. Your relationships may suffer, and you may not do well at work or school.  What can you do to manage stress?  You can try these things to help manage stress:   Do something active. Exercise or activity can help reduce stress. Walking is a great way to get started. Even everyday activities such as housecleaning or yard work can help.  Try yoga or miles chi. These techniques combine exercise and meditation. You may need some training at first to learn them.  Do something you enjoy. For example, listen to music or go to a movie. Practice your hobby or do volunteer  "work.  Meditate. This can help you relax, because you are not worrying about what happened before or what may happen in the future.  Do guided imagery. Imagine yourself in any setting that helps you feel calm. You can use online videos, books, or a teacher to guide you.  Do breathing exercises. For example:  From a standing position, bend forward from the waist with your knees slightly bent. Let your arms dangle close to the floor.  Breathe in slowly and deeply as you return to a standing position. Roll up slowly and lift your head last.  Hold your breath for just a few seconds in the standing position.  Breathe out slowly and bend forward from the waist.  Let your feelings out. Talk, laugh, cry, and express anger when you need to. Talking with supportive friends or family, a counselor, or a mechelle leader about your feelings is a healthy way to relieve stress. Avoid discussing your feelings with people who make you feel worse.  Write. It may help to write about things that are bothering you. This helps you find out how much stress you feel and what is causing it. When you know this, you can find better ways to cope.  What can you do to prevent stress?  You might try some of these things to help prevent stress:  Manage your time. This helps you find time to do the things you want and need to do.  Get enough sleep. Your body recovers from the stresses of the day while you are sleeping.  Get support. Your family, friends, and community can make a difference in how you experience stress.  Limit your news feed. Avoid or limit time on social media or news that may make you feel stressed.  Do something active. Exercise or activity can help reduce stress. Walking is a great way to get started.  Where can you learn more?  Go to https://www.[employer]erry[employer].net/patiented  Enter N032 in the search box to learn more about \"Learning About Stress.\"  Current as of: October 24, 2023  Content Version: 14.2 2024 [employer]. "   Care instructions adapted under license by your healthcare professional. If you have questions about a medical condition or this instruction, always ask your healthcare professional. [employer] disclaims any warranty or liability for your use of this information.

## 2024-10-15 LAB
ALBUMIN SERPL BCG-MCNC: 4.7 G/DL (ref 3.5–5.2)
ALP SERPL-CCNC: 102 U/L (ref 40–150)
ALT SERPL W P-5'-P-CCNC: 87 U/L (ref 0–70)
ANION GAP SERPL CALCULATED.3IONS-SCNC: 11 MMOL/L (ref 7–15)
AST SERPL W P-5'-P-CCNC: 48 U/L (ref 0–45)
BILIRUB SERPL-MCNC: 0.3 MG/DL
BUN SERPL-MCNC: 10.5 MG/DL (ref 6–20)
CALCIUM SERPL-MCNC: 10.1 MG/DL (ref 8.8–10.4)
CHLORIDE SERPL-SCNC: 103 MMOL/L (ref 98–107)
CHOLEST SERPL-MCNC: 151 MG/DL
CREAT SERPL-MCNC: 0.74 MG/DL (ref 0.67–1.17)
EGFRCR SERPLBLD CKD-EPI 2021: >90 ML/MIN/1.73M2
FASTING STATUS PATIENT QL REPORTED: NO
FASTING STATUS PATIENT QL REPORTED: NO
GLUCOSE SERPL-MCNC: 63 MG/DL (ref 70–99)
HCO3 SERPL-SCNC: 28 MMOL/L (ref 22–29)
HDLC SERPL-MCNC: 34 MG/DL
LDLC SERPL CALC-MCNC: 75 MG/DL
NONHDLC SERPL-MCNC: 117 MG/DL
POTASSIUM SERPL-SCNC: 4.3 MMOL/L (ref 3.4–5.3)
PROT SERPL-MCNC: 7.5 G/DL (ref 6.4–8.3)
SODIUM SERPL-SCNC: 142 MMOL/L (ref 135–145)
TRIGL SERPL-MCNC: 211 MG/DL
VIT D+METAB SERPL-MCNC: 30 NG/ML (ref 20–50)

## 2024-11-14 DIAGNOSIS — F90.9 ATTENTION DEFICIT HYPERACTIVITY DISORDER (ADHD), UNSPECIFIED ADHD TYPE: ICD-10-CM

## 2024-11-14 RX ORDER — LISDEXAMFETAMINE DIMESYLATE 40 MG/1
40 CAPSULE ORAL EVERY MORNING
Qty: 30 CAPSULE | Refills: 0 | Status: SHIPPED | OUTPATIENT
Start: 2024-11-14

## 2024-11-19 DIAGNOSIS — F90.9 ATTENTION DEFICIT HYPERACTIVITY DISORDER (ADHD), UNSPECIFIED ADHD TYPE: ICD-10-CM

## 2024-11-19 RX ORDER — DEXTROAMPHETAMINE SACCHARATE, AMPHETAMINE ASPARTATE, DEXTROAMPHETAMINE SULFATE AND AMPHETAMINE SULFATE 2.5; 2.5; 2.5; 2.5 MG/1; MG/1; MG/1; MG/1
10 TABLET ORAL DAILY
Qty: 30 TABLET | Refills: 0 | Status: SHIPPED | OUTPATIENT
Start: 2024-11-19

## 2024-12-17 DIAGNOSIS — F90.9 ATTENTION DEFICIT HYPERACTIVITY DISORDER (ADHD), UNSPECIFIED ADHD TYPE: ICD-10-CM

## 2024-12-19 RX ORDER — LISDEXAMFETAMINE DIMESYLATE 40 MG/1
40 CAPSULE ORAL EVERY MORNING
Qty: 30 CAPSULE | Refills: 0 | Status: SHIPPED | OUTPATIENT
Start: 2024-12-19

## 2025-01-21 ENCOUNTER — VIRTUAL VISIT (OUTPATIENT)
Dept: FAMILY MEDICINE | Facility: CLINIC | Age: 35
End: 2025-01-21
Payer: COMMERCIAL

## 2025-01-21 DIAGNOSIS — F11.21 OPIOID USE DISORDER, MODERATE, IN SUSTAINED REMISSION (H): ICD-10-CM

## 2025-01-21 DIAGNOSIS — N46.9 INFERTILITY MALE: ICD-10-CM

## 2025-01-21 DIAGNOSIS — F90.9 ATTENTION DEFICIT HYPERACTIVITY DISORDER (ADHD), UNSPECIFIED ADHD TYPE: Primary | ICD-10-CM

## 2025-01-21 DIAGNOSIS — F33.1 MODERATE RECURRENT MAJOR DEPRESSION (H): ICD-10-CM

## 2025-01-21 DIAGNOSIS — R06.83 SNORING: ICD-10-CM

## 2025-01-21 PROBLEM — F12.90 MARIJUANA USE: Status: ACTIVE | Noted: 2025-01-21

## 2025-01-21 PROCEDURE — 98006 SYNCH AUDIO-VIDEO EST MOD 30: CPT | Performed by: STUDENT IN AN ORGANIZED HEALTH CARE EDUCATION/TRAINING PROGRAM

## 2025-01-21 RX ORDER — DEXTROAMPHETAMINE SACCHARATE, AMPHETAMINE ASPARTATE, DEXTROAMPHETAMINE SULFATE AND AMPHETAMINE SULFATE 2.5; 2.5; 2.5; 2.5 MG/1; MG/1; MG/1; MG/1
10 TABLET ORAL DAILY
Qty: 30 TABLET | Refills: 0 | Status: SHIPPED | OUTPATIENT
Start: 2025-01-21

## 2025-01-21 RX ORDER — LISDEXAMFETAMINE DIMESYLATE 40 MG/1
40 CAPSULE ORAL EVERY MORNING
Qty: 30 CAPSULE | Refills: 0 | Status: SHIPPED | OUTPATIENT
Start: 2025-01-21

## 2025-01-21 ASSESSMENT — PATIENT HEALTH QUESTIONNAIRE - PHQ9
SUM OF ALL RESPONSES TO PHQ QUESTIONS 1-9: 4
SUM OF ALL RESPONSES TO PHQ QUESTIONS 1-9: 4

## 2025-01-21 NOTE — PROGRESS NOTES
Jono is a 34 year old who is being evaluated via a billable video visit.        Jono was seen today for follow up.    Diagnoses and all orders for this visit:    Attention deficit hyperactivity disorder (ADHD), unspecified ADHD type  Comments:  Chronic, well controlled. Follow up in 3 months in person to complete CSA. Continue current management.  Orders:  -     lisdexamfetamine (VYVANSE) 40 MG capsule; Take 1 capsule (40 mg) by mouth every morning.  -     amphetamine-dextroamphetamine (ADDERALL) 10 MG tablet; Take 1 tablet (10 mg) by mouth daily.  -     PRIMARY CARE FOLLOW-UP SCHEDULING; Future    Snoring  Comments:  SAMMY score 4, refer to sleep medicine.  Orders:  -     Adult Sleep Eval & Management  Referral; Future    Infertility male  Comments:  Has been trying for a year. Partner had recent miscarraige. Was recommended by partner's OB to do semen analysis.  Orders:  -     Semen Analysis, Strict Morphology (MAKAYLA); Future    Moderate recurrent major depression (H)  Comments:  chronic controlled on guanfacine. continue.    Opioid use disorder, moderate, in sustained remission (H)  Comments:  Follows with outside provider. On suboxone. Need to fill out JANNY at next visit.        The longitudinal plan of care for the diagnosis(es)/condition(s) as documented were addressed during this visit. Due to the added complexity in care, I will continue to support Jono in the subsequent management and with ongoing continuity of care.      Subjective   Jono is a 34 year old, presenting for the following health issues:  Follow Up    History of Present Illness       Reason for visit:  Follow up    He eats 2-3 servings of fruits and vegetables daily.He consumes 1 sweetened beverage(s) daily.   He is taking medications regularly.     Recently lost job, doesn't feel that it is related to concentrating of ADHD.  [facility] wise     Had sleep study in [place], however, was so ar out, he was not able to  go  Snore  Apnea  Tired during the day    HT; 6'0  Wt: 205lb  Treated for high BP    Trying to conceive  Was pregnant but miscarriage in June  Has been trying for about a year now      Objective    Vitals - Patient Reported  Weight (Patient Reported): 93 kg (205 lb)  Height (Patient Reported): 182.9 cm (6')  BMI (Based on Pt Reported Ht/Wt): 27.8        Physical Exam   GENERAL: alert and no distress  EYES: Eyes grossly normal to inspection.  No discharge or erythema, or obvious scleral/conjunctival abnormalities.  RESP: No audible wheeze, cough, or visible cyanosis.    SKIN: Visible skin clear. No significant rash, abnormal pigmentation or lesions.  NEURO: Cranial nerves grossly intact.  Mentation and speech appropriate for age.  PSYCH: Appropriate affect, tone, and pace of words          Video-Visit Details    Type of service:  Video Visit   Originating Location (pt. Location): Home    Distant Location (provider location):  On-site  Platform used for Video Visit: Urvashi  Signed Electronically by: Sandra Yap MD

## 2025-01-21 NOTE — Clinical Note
Semen analysis ordered. Is someone able to call to give him directions? Not sure if this should be given by MA, lab, or RN.

## 2025-02-25 ENCOUNTER — LAB (OUTPATIENT)
Dept: LAB | Facility: CLINIC | Age: 35
End: 2025-02-25
Payer: COMMERCIAL

## 2025-02-25 DIAGNOSIS — N46.9 INFERTILITY MALE: ICD-10-CM

## 2025-02-25 PROCEDURE — 89322 SEMEN ANAL STRICT CRITERIA: CPT

## 2025-02-26 LAB
ABNORMAL SPERM MORPHOLOGY: 99
ABSTINENCE DAYS: ABNORMAL
AGGLUTINATION: NO
ANALYSIS TEMP - CENTIGRADE: 22 CENTIGRADE
COLLECTION METHOD: ABNORMAL
COLLECTION SITE: ABNORMAL
CONSENT TO RELEASE TO PARTNER: YES
DAL- RECEIVED TIME: ABNORMAL
HEAD DEFECT: 99 %
IMMOTILE: 28 %
LIQUEFIED: YES
MIDPIECE DEFECT: 37 %
NON-PROGRESSIVE MOTILITY: 3 %
NORMAL SPERM MORPHOLOGY: 1 % NORMAL FORMS
PROGRESSIVE MOTILITY: 69 %
ROUND CELLS: 0.4 MILLION/ML
SPECIMEN PH: 7.2 PH
SPECIMEN VOLUME: 3.1 ML
SPERM CONCENTRATION: 122 MILLION/ML
TAIL DEFECT: 18 %
TIME OF ANALYSIS: ABNORMAL
TOTAL PROGRESSIVE MOTILE NUMBER: 261 MILLION
TOTAL SPERM NUMBER: 378 MILLION
VISCOUS: NO
VITALITY: ABNORMAL

## 2025-02-27 DIAGNOSIS — F90.9 ATTENTION DEFICIT HYPERACTIVITY DISORDER (ADHD), UNSPECIFIED ADHD TYPE: ICD-10-CM

## 2025-02-27 RX ORDER — LISDEXAMFETAMINE DIMESYLATE 40 MG/1
40 CAPSULE ORAL EVERY MORNING
Qty: 30 CAPSULE | Refills: 0 | Status: SHIPPED | OUTPATIENT
Start: 2025-02-27

## 2025-02-27 RX ORDER — DEXTROAMPHETAMINE SACCHARATE, AMPHETAMINE ASPARTATE, DEXTROAMPHETAMINE SULFATE AND AMPHETAMINE SULFATE 2.5; 2.5; 2.5; 2.5 MG/1; MG/1; MG/1; MG/1
10 TABLET ORAL DAILY
Qty: 30 TABLET | Refills: 0 | Status: SHIPPED | OUTPATIENT
Start: 2025-02-27

## 2025-03-25 DIAGNOSIS — F90.9 ATTENTION DEFICIT HYPERACTIVITY DISORDER (ADHD), UNSPECIFIED ADHD TYPE: ICD-10-CM

## 2025-03-25 RX ORDER — DEXTROAMPHETAMINE SACCHARATE, AMPHETAMINE ASPARTATE, DEXTROAMPHETAMINE SULFATE AND AMPHETAMINE SULFATE 2.5; 2.5; 2.5; 2.5 MG/1; MG/1; MG/1; MG/1
10 TABLET ORAL DAILY
Qty: 30 TABLET | Refills: 0 | Status: SHIPPED | OUTPATIENT
Start: 2025-03-25

## 2025-03-25 RX ORDER — LISDEXAMFETAMINE DIMESYLATE 40 MG/1
40 CAPSULE ORAL EVERY MORNING
Qty: 30 CAPSULE | Refills: 0 | Status: SHIPPED | OUTPATIENT
Start: 2025-03-25

## 2025-04-21 ENCOUNTER — OFFICE VISIT (OUTPATIENT)
Dept: FAMILY MEDICINE | Facility: CLINIC | Age: 35
End: 2025-04-21
Attending: STUDENT IN AN ORGANIZED HEALTH CARE EDUCATION/TRAINING PROGRAM
Payer: COMMERCIAL

## 2025-04-21 VITALS
HEART RATE: 87 BPM | RESPIRATION RATE: 18 BRPM | OXYGEN SATURATION: 97 % | HEIGHT: 72 IN | DIASTOLIC BLOOD PRESSURE: 72 MMHG | TEMPERATURE: 97.2 F | BODY MASS INDEX: 27.36 KG/M2 | SYSTOLIC BLOOD PRESSURE: 116 MMHG | WEIGHT: 202 LBS

## 2025-04-21 DIAGNOSIS — F12.90 MARIJUANA USE: ICD-10-CM

## 2025-04-21 DIAGNOSIS — F33.1 MODERATE RECURRENT MAJOR DEPRESSION (H): ICD-10-CM

## 2025-04-21 DIAGNOSIS — F90.9 ATTENTION DEFICIT HYPERACTIVITY DISORDER (ADHD), UNSPECIFIED ADHD TYPE: Primary | ICD-10-CM

## 2025-04-21 DIAGNOSIS — Z23 NEED FOR HEPATITIS A VACCINATION: ICD-10-CM

## 2025-04-21 DIAGNOSIS — F11.21 OPIOID USE DISORDER, MODERATE, IN SUSTAINED REMISSION (H): ICD-10-CM

## 2025-04-21 PROBLEM — E78.5 DYSLIPIDEMIA: Status: RESOLVED | Noted: 2023-04-10 | Resolved: 2025-04-21

## 2025-04-21 PROCEDURE — G2211 COMPLEX E/M VISIT ADD ON: HCPCS | Performed by: STUDENT IN AN ORGANIZED HEALTH CARE EDUCATION/TRAINING PROGRAM

## 2025-04-21 PROCEDURE — 96127 BRIEF EMOTIONAL/BEHAV ASSMT: CPT | Performed by: STUDENT IN AN ORGANIZED HEALTH CARE EDUCATION/TRAINING PROGRAM

## 2025-04-21 PROCEDURE — 99214 OFFICE O/P EST MOD 30 MIN: CPT | Mod: 25 | Performed by: STUDENT IN AN ORGANIZED HEALTH CARE EDUCATION/TRAINING PROGRAM

## 2025-04-21 PROCEDURE — 90471 IMMUNIZATION ADMIN: CPT | Performed by: STUDENT IN AN ORGANIZED HEALTH CARE EDUCATION/TRAINING PROGRAM

## 2025-04-21 PROCEDURE — 90632 HEPA VACCINE ADULT IM: CPT | Performed by: STUDENT IN AN ORGANIZED HEALTH CARE EDUCATION/TRAINING PROGRAM

## 2025-04-21 NOTE — PROGRESS NOTES
Prior to immunization administration, verified patients identity using patient s name and date of birth. Please see Immunization Activity for additional information.     Screening Questionnaire for Adult Immunization    Are you sick today?   No   Do you have allergies to medications, food, a vaccine component or latex?   Yes   Have you ever had a serious reaction after receiving a vaccination?   No   Do you have a long-term health problem with heart, lung, kidney, or metabolic disease (e.g., diabetes), asthma, a blood disorder, no spleen, complement component deficiency, a cochlear implant, or a spinal fluid leak?  Are you on long-term aspirin therapy?   No   Do you have cancer, leukemia, HIV/AIDS, or any other immune system problem?   No   Do you have a parent, brother, or sister with an immune system problem?   No   In the past 3 months, have you taken medications that affect  your immune system, such as prednisone, other steroids, or anticancer drugs; drugs for the treatment of rheumatoid arthritis, Crohn s disease, or psoriasis; or have you had radiation treatments?   No   Have you had a seizure, or a brain or other nervous system problem?   No   During the past year, have you received a transfusion of blood or blood    products, or been given immune (gamma) globulin or antiviral drug?   No   For women: Are you pregnant or is there a chance you could become       pregnant during the next month?   No   Have you received any vaccinations in the past 4 weeks?   No     Immunization questionnaire was positive for at least one answer.  Notified Thor.      Patient instructed to remain in clinic for 15 minutes afterwards, and to report any adverse reactions.     Screening performed by Leesa Conti MA on 4/21/2025 at 3:21 PM.

## 2025-04-21 NOTE — Clinical Note
Opioid / Opioid Plus Controlled Substance Agreement    This is an agreement between you and your provider about the safe and appropriate use of controlled substance/opioids prescribed by your care team. Controlled substances are medicines that can cause physical and mental dependence (abuse).    There are strict laws about having and using these medicines. We here at [facility] are committing to working with you in your efforts to get better. To support you in this work, we ll help you schedule regular office appointments for medicine refills. If we must cancel or change your appointment for any reason, we ll make sure you have enough medicine to last until your next appointment.     As a Provider, I will:    Listen carefully to your concerns and treat you with respect.     Recommend a treatment plan that I believe is in your best interest. This plan may involve therapies other than opioid pain medication.     Talk with you often about the possible benefits, and the risk of harm of any medicine that we prescribe for you.     Provide a plan on how to taper (discontinue or go off) using this medicine if the decision is made to stop its use.    As a Patient, I understand that opioid(s):     Are a controlled substance prescribed by my care team to help me function or work and manage my condition(s).     Are strong medicines and can cause serious side effects such as:    Drowsiness, which can seriously affect my driving ability    A lower breathing rate, enough to cause death    Harm to my thinking ability     Depression     Abuse of and addiction to this medicine    Need to be taken exactly as prescribed. Combining opioids with certain medicines or chemicals (such as illegal drugs, sedatives, sleeping pills, and benzodiazepines) can be dangerous or even fatal. If I stop opioids suddenly, I may have severe withdrawal symptoms.    Do not work for all types of pain nor for all patients. If they re not helpful, I may  be asked to stop them.    {Benzo / Stimulant (Optional):840544}    The risks, benefits and side effects of these medicine(s) were explained to me. I agree that:  1. I will take part in other treatments as advised by my care team. This may be psychiatry or counseling, physical therapy, behavioral therapy, group treatment or a referral to a specialist.     2. I will keep all my appointments. I understand that this is part of the monitoring of opioids. My care team may require an office visit for EVERY opioid/controlled substance refill. If I miss appointments or don t follow instructions, my care team may stop my medicine.    3. I will take my medicines as prescribed. I will not change the dose or schedule unless my care team tells me to. There will be no refills if I run out early.     4. I may be asked to come to the clinic and complete a urine drug test or complete a pill count at any time. If I don t give a urine sample or participate in a pill count, the care team may stop my medicine.    5. I will only receive prescriptions from this clinic for chronic pain. If I am treated by another provider for acute pain issues, I will tell them that I am taking opioid pain medication for chronic pain and that I have a treatment agreement with this provider. I will inform my [facility] care team within one business day if I am given a prescription for any pain medication by another healthcare provider. My [facility] care team can contact other providers and pharmacists about my use of any medicines.    6. It is up to me to make sure that I don t run out of my medicines on weekends or holidays. If my care team is willing to refill my opioid prescription without a visit, I must request refills only during office hours. Refills may take up to 3 business days to process. I will use one pharmacy to fill all my opioid and other controlled substance prescriptions. I will notify the clinic about any changes to my  insurance or medication availability.    7. I am responsible for my prescriptions. If the medicine/prescription is lost, stolen or destroyed, it will not be replaced. I also agree not to share controlled substance medicines with anyone.    8. I am aware I should not use any illegal or recreational drugs. I agree not to drink alcohol unless my care team says I can.       9. If I enroll in the [place] Medical Cannabis program, I will tell my care team prior to my next refill.     10. I will tell my care team right away if I become pregnant, have a new medical problem treated outside of my regular clinic, or have a change in my medications.    11. I understand that this medicine can affect my thinking, judgment and reaction time. Alcohol and drugs affect the brain and body, which can affect the safety of my driving. Being under the influence of alcohol or drugs can affect my decision-making, behaviors, personal safety, and the safety of others. Driving while impaired (DWI) can occur if a person is driving, operating, or in physical control of a car, motorcycle, boat, snowmobile, ATV, motorbike, off-road vehicle, or any other motor vehicle ([place] Statute 169A.20). I understand the risk if I choose to drive or operate any vehicle or machinery.    I understand that if I do not follow any of the conditions above, my prescriptions or treatment may be stopped or changed.          Opioids  What You Need to Know    What are opioids?   Opioids are pain medicines that must be prescribed by a doctor. They are also known as narcotics.     Examples are:   1. morphine (MS Contin, Josephine)  2. oxycodone (Oxycontin)  3. oxycodone and acetaminophen (Percocet)  4. hydrocodone and acetaminophen (Vicodin, Norco)   5. fentanyl patch (Duragesic)   6. hydromorphone (Dilaudid)   7. methadone  8. codeine (Tylenol #3)     What do opioids do well?   Opioids are best for severe short-term pain such as after a surgery or injury. They may work well  for cancer pain. They may help some people with long-lasting (chronic) pain.     What do opioids NOT do well?   Opioids never get rid of pain entirely, and they don t work well for most patients with chronic pain. Opioids don t reduce swelling, one of the causes of pain.                                    Other ways to manage chronic pain and improve function include:       Treat the health problem that may be causing pain    Anti-inflammation medicines, which reduce swelling and tenderness, such as ibuprofen (Advil, Motrin) or naproxen (Aleve)    Acetaminophen (Tylenol)    Antidepressants and anti-seizure medicines, especially for nerve pain    Topical treatments such as patches or creams    Injections or nerve blocks    Chiropractic or osteopathic treatment    Acupuncture, massage, deep breathing, meditation, visual imagery, aromatherapy    Use heat or ice at the pain site    Physical therapy     Exercise    Stop smoking    Take part in therapy       Risks and side effects     Talk to your doctor before you start or decide to keep taking opioids. Possible side effects include:      Lowering your breathing rate enough to cause death    Overdose, including death, especially if taking higher than prescribed doses    Worse depression symptoms; less pleasure in things you usually enjoy    Feeling tired or sluggish    Slower thoughts or cloudy thinking    Being more sensitive to pain over time; pain is harder to control    Trouble sleeping or restless sleep    Changes in hormone levels (for example, less testosterone)    Changes in sex drive or ability to have sex    Constipation    Unsafe driving    Itching and sweating    Dizziness    Nausea, throwing up and dry mouth    What else should I know about opioids?    Opioids may lead to dependence, tolerance, or addiction.      Dependence means that if you stop or reduce the medicine too quickly, you will have withdrawal symptoms. These include loose poop (diarrhea),  jitters, flu-like symptoms, nervousness and tremors. Dependence is not the same as addiction.                       Tolerance means needing higher doses over time to get the same effect. This may increase the chance of serious side effects.      Addiction is when people improperly use a substance that harms their body, their mind or their relations with others. Use of opiates can cause a relapse of addiction if you have a history of drug or alcohol abuse.      People who have used opioids for a long time may have a lower quality of life, worse depression, higher levels of pain and more visits to doctors.    You can overdose on opioids. Take these steps to lower your risk of overdose:    1. Recognize the signs:  Signs of overdose include decrease or loss of consciousness (blackout), slowed breathing, trouble waking up and blue lips. If someone is worried about overdose, they should call 911.    2. Talk to your doctor about Narcan (naloxone).   If you are at risk for overdose, you may be given a prescription for Narcan. This medicine very quickly reverses the effects of opioids.   If you overdose, a friend or family member can give you Narcan while waiting for the ambulance. They need to know the signs of overdose and how to give Narcan.     3. Don't use alcohol or street drugs.   Taking them with opioids can cause death.    4. Do not take any of these medicines unless your doctor says it s OK. Taking these with opioids can cause death:    Benzodiazepines, such as lorazepam (Ativan), alprazolam (Xanax) or diazepam (Valium)    Muscle relaxers, such as cyclobenzaprine (Flexeril)    Sleeping pills like zolpidem (Ambien)     Other opioids      How to keep you and other people safe while taking opioids:    1. Never share your opioids with others.  Opioid medicines are regulated by the Drug Enforcement Agency ([employer]). Selling or sharing medications is a criminal act.    2. Be sure to store opioids in a secure place, locked up  if possible. Young children can easily swallow them and overdose.    3. When you are traveling with your medicines, keep them in the original bottles. If you use a pill box, be sure you also carry a copy of your medicine list from your clinic or pharmacy.    4. Safe disposal of opioids    Most pharmacies have places to get rid of medicine, called disposal kiosks. Medicine disposal options are also available in every [facility]. Search your county and  medication disposal  to find more options. You can find more details at:  https://[email or web address]-green/managing-unwanted-medications     I agree that my provider, clinic care team, and pharmacy may work with any city, state or federal law enforcement agency that investigates the misuse, sale, or other diversion of my controlled medicine. I will allow my provider to discuss my care with, or share a copy of, this agreement with any other treating provider, pharmacy or emergency room where I receive care.    I have read this agreement and have asked questions about anything I did not understand.    _______________________________________________________  Patient Signature - Jono Gar _____________________                   Date     _______________________________________________________  Provider Signature - Sandra Yap MD   _____________________                   Date     _______________________________________________________  Witness Signature (required if provider not present while patient signing)   _____________________                   Date

## 2025-04-21 NOTE — PROGRESS NOTES
Jono was seen today for recheck.    Diagnoses and all orders for this visit:    Attention deficit hyperactivity disorder (ADHD), unspecified ADHD type  Comments:  Chronic, well controlled.  Signed CSA today.  Urine drug screen due 10/14/2025.  Continue Vyvanse 40 mg in the morning and Adderall 10 mg afternoon  Orders:  -     PRIMARY CARE FOLLOW-UP SCHEDULING  -     REVIEW OF HEALTH MAINTENANCE PROTOCOL ORDERS  - RTC 6 months    Moderate recurrent major depression (H)  Comments:  Chronic, well-controlled on Effexor continue  RTC 6 months    Opioid use disorder, moderate, in sustained remission (H)  Comments:  Follows with outside provider, on Suboxone  Signed JANNY today    Marijuana use    Need for hepatitis A vaccination  -     HEPATITIS A 19+ (HAVRIX/VAQTA)        The longitudinal plan of care for the diagnosis(es)/condition(s) as documented were addressed during this visit. Due to the added complexity in care, I will continue to support Jens in the subsequent management and with ongoing continuity of care.      Subjective   Jono is a 35 year old, presenting for the following health issues:  RECHECK        4/21/2025     3:20 PM   Additional Questions   Roomed by Reynaldo SKAGGS   Accompanied by self     History of Present Illness       Mental Health Follow-up:  Patient presents to follow-up on Depression.Patient's depression since last visit has been:  No change  The patient is not having other symptoms associated with depression.      Any significant life events: job concerns and financial concerns  Patient is not feeling anxious or having panic attacks.  Patient has no concerns about alcohol or drug use.    Reason for visit:  Follow up to sign form    He eats 2-3 servings of fruits and vegetables daily.He consumes 1 sweetened beverage(s) daily.He exercises with enough effort to increase his heart rate 10 to 19 minutes per day.  He exercises with enough effort to increase his heart rate 3 or less days per week.   He  "is taking medications regularly.              5/30/2024     6:53 AM 7/10/2024     2:49 PM 1/21/2025     5:02 PM   PHQ   PHQ-9 Total Score 6 2 4    Q9: Thoughts of better off dead/self-harm past 2 weeks Not at all  Not at all Not at all        Proxy-reported             Objective    /72 (BP Location: Left arm, Patient Position: Sitting, Cuff Size: Adult Regular)   Pulse 87   Temp 97.2  F (36.2  C) (Temporal)   Resp 18   Ht 1.82 m (5' 11.65\")   Wt 91.6 kg (202 lb)   SpO2 97%   BMI 27.66 kg/m    Body mass index is 27.66 kg/m .  Physical Exam   General: Well developed, well nourished.  Skin:  Dry without rash.    Head:  Normocephalic-atraumatic.    Eye:  Normal conjunctivae.     Respiratory:  Normal respiratory effort.   Gastrointestinal:  Non-distended.    Musculoskeletal:  No deformity or edema.  Neurologic: No focal deficits.          Signed Electronically by: Sandra Yap MD    "

## 2025-04-21 NOTE — LETTER
[facility]  04/21/25  Patient: Jono Gar  YOB: 1990  Medical Record Number: [id number]                                                                                  Non-Opioid Controlled Substance Agreement    This is an agreement between you and your provider regarding safe and appropriate use of controlled substances prescribed by your care team. Controlled substances are?medicines that can cause physical and mental dependence (abuse).     There are strict laws about having and using these medicines. We here at [facility] are  committed to working with you in your efforts to get better. To support you in this work, we'll help you schedule regular office appointments for medicine refills. If we must cancel or change your appointment for any reason, we'll make sure you have enough medicine to last until your next appointment.     As a Provider, I will:   Listen carefully to your concerns while treating you with respect.   Recommend a treatment plan that I believe is in your best interest and may involve therapies other than medicine.    Talk with you often about the possible benefits and the risk of harm of any medicine that we prescribe for you.  Assess the safety of this medicine and check how well it works.    Provide a plan on how to taper (discontinue or go off) using this medicine if the decision is made to stop its use.      ::  As a Patient, I understand controlled substances:     Are prescribed by my care provider to help me function or work and manage my condition(s).?  Are strong medicines and can cause serious side effects.     Need to be taken exactly as prescribed.?Combining controlled substances with certain medicines or chemicals (such as illegal drugs, alcohol, sedatives, sleeping pills, and benzodiazepines) can be dangerous or even fatal.? If I stop taking my medicines suddenly, I may have severe withdrawal symptoms.     The risks,  benefits, and side effects of these medicine(s) were explained to me. I agree that:    I will take part in other treatments as advised by my care team. This may be psychiatry or counseling, physical therapy, behavioral therapy, group treatment or a referral to specialist.    I will keep all my appointments and understand this is part of the monitoring of controlled substances.?My care team may require an office visit for EVERY controlled substance refill. If I miss appointments or don t follow instructions, my care team may stop my medicine    I will take my medicines as prescribed. I will not change the dose or schedule unless my care team tells me to. There will be no refills if I run out early.      I may be asked to come to the clinic and complete a urine drug test or complete a pill count. If I don t give a urine sample or participate in a pill count, the care team may stop my medicine.    I will only receive controlled substance prescriptions from this clinic. If I am treated by another provider, I will tell them that I am taking controlled substances and that I have a treatment agreement with this provider. I will inform my [facility] care team within one business day if I am given a prescription for any controlled substance by another healthcare provider. My [facility] care team can contact other providers and pharmacists about my use of any medicines.    It is up to me to make sure that I don't run out of my medicines on weekends or holidays.?If my care team is willing to refill my prescription without a visit, I must request refills only during office hours. Refills may take up to 3 business days to process. I will use one pharmacy to fill all my controlled substance prescriptions. I will notify the clinic about any changes to my insurance or medicine availability.    I am responsible for my prescriptions. If the medicine/prescription is lost, stolen or destroyed, it will not be replaced.?I  also agree not to share controlled substance medicines with anyone.     I am aware I should not use any illegal or recreational drugs. I agree not to drink alcohol unless my care team says I can.     If I enroll in the [place] Medical Cannabis program, I will tell my care team before my next refill.    I will tell my care team right away if I become pregnant, have a new medical problem treated outside of my regular clinic, or have a change in my medicines.     I understand that this medicine can affect my thinking, judgment and reaction time.? Alcohol and drugs affect the brain and body, which can affect the safety of my driving. Being under the influence of alcohol or drugs can affect my decision-making, behaviors, personal safety and the safety of others. Driving while impaired (DWI) can occur if a person is driving, operating or in physical control of a car, motorcycle, boat, snowmobile, ATV, motorbike, off-road vehicle or any other motor vehicle ([place] Statute 169[place].20). I understand the risk if I choose to drive or operate any vehicle or machinery.    I understand that if I do not follow any of the conditions above, my prescriptions or treatment may be stopped or changed.   I agree that my provider, clinic care team and pharmacy may work with any city, state or federal law enforcement agency that investigates the misuse, sale or other diversion of my controlled medicine. I will allow my provider to discuss my care with, or share a copy of, this agreement with any other treating provider, pharmacy or emergency room where I receive care.     I have read this agreement and have asked questions about anything I did not understand.    ________________________________________________________  Patient Signature - Jono Gar     ___________________                   Date     ________________________________________________________  Provider Signature - Sandra Yap MD       ___________________                    Date     ________________________________________________________  Witness Signature (required if provider not present while patient signing)          ___________________                   Date

## 2025-05-05 DIAGNOSIS — F90.9 ATTENTION DEFICIT HYPERACTIVITY DISORDER (ADHD), UNSPECIFIED ADHD TYPE: ICD-10-CM

## 2025-05-05 RX ORDER — DEXTROAMPHETAMINE SACCHARATE, AMPHETAMINE ASPARTATE, DEXTROAMPHETAMINE SULFATE AND AMPHETAMINE SULFATE 2.5; 2.5; 2.5; 2.5 MG/1; MG/1; MG/1; MG/1
10 TABLET ORAL DAILY
Qty: 30 TABLET | Refills: 0 | Status: SHIPPED | OUTPATIENT
Start: 2025-05-05

## 2025-05-05 RX ORDER — LISDEXAMFETAMINE DIMESYLATE 40 MG/1
40 CAPSULE ORAL EVERY MORNING
Qty: 30 CAPSULE | Refills: 0 | Status: SHIPPED | OUTPATIENT
Start: 2025-05-05

## 2025-06-15 DIAGNOSIS — F33.1 MODERATE RECURRENT MAJOR DEPRESSION (H): ICD-10-CM

## 2025-06-16 RX ORDER — VENLAFAXINE HYDROCHLORIDE 150 MG/1
150 CAPSULE, EXTENDED RELEASE ORAL DAILY
Qty: 90 CAPSULE | Refills: 3 | Status: SHIPPED | OUTPATIENT
Start: 2025-06-16

## 2025-07-05 DIAGNOSIS — F90.9 ATTENTION DEFICIT HYPERACTIVITY DISORDER (ADHD), UNSPECIFIED ADHD TYPE: ICD-10-CM

## 2025-07-07 RX ORDER — DEXTROAMPHETAMINE SACCHARATE, AMPHETAMINE ASPARTATE, DEXTROAMPHETAMINE SULFATE AND AMPHETAMINE SULFATE 2.5; 2.5; 2.5; 2.5 MG/1; MG/1; MG/1; MG/1
10 TABLET ORAL DAILY
Qty: 30 TABLET | Refills: 0 | Status: SHIPPED | OUTPATIENT
Start: 2025-07-07

## 2025-07-07 RX ORDER — LISDEXAMFETAMINE DIMESYLATE 40 MG/1
40 CAPSULE ORAL EVERY MORNING
Qty: 30 CAPSULE | Refills: 0 | Status: SHIPPED | OUTPATIENT
Start: 2025-07-07

## 2025-07-15 ENCOUNTER — OFFICE VISIT (OUTPATIENT)
Dept: FAMILY MEDICINE | Facility: CLINIC | Age: 35
End: 2025-07-15
Payer: COMMERCIAL

## 2025-07-15 VITALS
OXYGEN SATURATION: 99 % | TEMPERATURE: 98.7 F | HEART RATE: 103 BPM | HEIGHT: 71 IN | DIASTOLIC BLOOD PRESSURE: 91 MMHG | SYSTOLIC BLOOD PRESSURE: 131 MMHG | BODY MASS INDEX: 26.86 KG/M2 | RESPIRATION RATE: 16 BRPM | WEIGHT: 191.9 LBS

## 2025-07-15 DIAGNOSIS — K59.00 CONSTIPATION, UNSPECIFIED CONSTIPATION TYPE: ICD-10-CM

## 2025-07-15 DIAGNOSIS — R74.8 ELEVATED LIVER ENZYMES: ICD-10-CM

## 2025-07-15 DIAGNOSIS — L74.9 SWEATING ABNORMALITY: Primary | ICD-10-CM

## 2025-07-15 DIAGNOSIS — K62.5 BLOOD PER RECTUM: ICD-10-CM

## 2025-07-15 DIAGNOSIS — I10 PRIMARY HYPERTENSION: ICD-10-CM

## 2025-07-15 DIAGNOSIS — R11.0 NAUSEA: ICD-10-CM

## 2025-07-15 LAB
ERYTHROCYTE [DISTWIDTH] IN BLOOD BY AUTOMATED COUNT: 12.1 % (ref 10–15)
EST. AVERAGE GLUCOSE BLD GHB EST-MCNC: 117 MG/DL
HBA1C MFR BLD: 5.7 % (ref 0–5.6)
HCT VFR BLD AUTO: 42.9 % (ref 40–53)
HGB BLD-MCNC: 15.2 G/DL (ref 13.3–17.7)
MCH RBC QN AUTO: 30.7 PG (ref 26.5–33)
MCHC RBC AUTO-ENTMCNC: 35.4 G/DL (ref 31.5–36.5)
MCV RBC AUTO: 87 FL (ref 78–100)
PLATELET # BLD AUTO: 314 10E3/UL (ref 150–450)
RBC # BLD AUTO: 4.95 10E6/UL (ref 4.4–5.9)
WBC # BLD AUTO: 10.2 10E3/UL (ref 4–11)

## 2025-07-15 PROCEDURE — 83690 ASSAY OF LIPASE: CPT | Performed by: NURSE PRACTITIONER

## 2025-07-15 PROCEDURE — 84443 ASSAY THYROID STIM HORMONE: CPT | Performed by: NURSE PRACTITIONER

## 2025-07-15 PROCEDURE — 86481 TB AG RESPONSE T-CELL SUSP: CPT | Performed by: NURSE PRACTITIONER

## 2025-07-15 PROCEDURE — 80053 COMPREHEN METABOLIC PANEL: CPT | Performed by: NURSE PRACTITIONER

## 2025-07-15 PROCEDURE — G2211 COMPLEX E/M VISIT ADD ON: HCPCS | Performed by: NURSE PRACTITIONER

## 2025-07-15 PROCEDURE — 83036 HEMOGLOBIN GLYCOSYLATED A1C: CPT | Performed by: NURSE PRACTITIONER

## 2025-07-15 PROCEDURE — 99214 OFFICE O/P EST MOD 30 MIN: CPT | Performed by: NURSE PRACTITIONER

## 2025-07-15 PROCEDURE — 3079F DIAST BP 80-89 MM HG: CPT | Performed by: NURSE PRACTITIONER

## 2025-07-15 PROCEDURE — 36415 COLL VENOUS BLD VENIPUNCTURE: CPT | Performed by: NURSE PRACTITIONER

## 2025-07-15 PROCEDURE — 87389 HIV-1 AG W/HIV-1&-2 AB AG IA: CPT | Performed by: NURSE PRACTITIONER

## 2025-07-15 PROCEDURE — 87340 HEPATITIS B SURFACE AG IA: CPT | Performed by: NURSE PRACTITIONER

## 2025-07-15 PROCEDURE — 1126F AMNT PAIN NOTED NONE PRSNT: CPT | Performed by: NURSE PRACTITIONER

## 2025-07-15 PROCEDURE — 85027 COMPLETE CBC AUTOMATED: CPT | Performed by: NURSE PRACTITIONER

## 2025-07-15 PROCEDURE — 3075F SYST BP GE 130 - 139MM HG: CPT | Performed by: NURSE PRACTITIONER

## 2025-07-15 RX ORDER — ONDANSETRON 4 MG/1
4 TABLET, FILM COATED ORAL EVERY 8 HOURS PRN
Qty: 30 TABLET | Refills: 0 | Status: SHIPPED | OUTPATIENT
Start: 2025-07-15

## 2025-07-15 RX ORDER — POLYETHYLENE GLYCOL 3350 17 G/17G
1 POWDER, FOR SOLUTION ORAL DAILY
Qty: 578 G | Refills: 0 | Status: SHIPPED | OUTPATIENT
Start: 2025-07-15

## 2025-07-15 ASSESSMENT — PATIENT HEALTH QUESTIONNAIRE - PHQ9
10. IF YOU CHECKED OFF ANY PROBLEMS, HOW DIFFICULT HAVE THESE PROBLEMS MADE IT FOR YOU TO DO YOUR WORK, TAKE CARE OF THINGS AT HOME, OR GET ALONG WITH OTHER PEOPLE: VERY DIFFICULT
SUM OF ALL RESPONSES TO PHQ QUESTIONS 1-9: 11
SUM OF ALL RESPONSES TO PHQ QUESTIONS 1-9: 11

## 2025-07-15 ASSESSMENT — PAIN SCALES - GENERAL: PAINLEVEL_OUTOF10: NO PAIN (0)

## 2025-07-15 NOTE — PROGRESS NOTES
"  Assessment & Plan     Sweating abnormality  Patient presents with concerns of increased sweating associated with nausea and one episode of emesis a month and a half ago. Vomiting resolved. Persistent nausea and daily sweating.   Will get labs as below r/o underlying medical conditions  Discussed possible side effect of stimulants   Nausea and vomiting episode possibly cannabis hyperemesis- as vomiting has improved.  Continue cannabis cessation  Further recommendations pending lab results  - Comprehensive metabolic panel  - TSH with free T4 reflex  - Hemoglobin A1c  - Quantiferon TB Gold Plus  - HIV Antigen Antibody Combo    Blood per rectum  External rectal exam negative for hemorrhoids or anal fissures. Will get colonoscopy r/o colitis/malignancy  Possible s/t constipation.  - CBC with platelets  - Colonoscopy Screening  Referral    Constipation, unspecified constipation type   Good fiber intake - daily fruits/vegetables 5-6 servings a day  Water intake 6-8 cups of water a day  OTC daily soluble fiber  OTC daily Miralax for 2 weeks and then prn    Nausea  - ondansetron (ZOFRAN) 4 MG tablet  Dispense: 30 tablet; Refill: 0    Primary hypertension  /96 manual check.  Reporting BP at home in the 120-130's over 70'-80's  Advised to continue monitoring BP at home, if persistently >140/90 then follow up with PCP.    The longitudinal plan of care for the diagnosis(es)/condition(s) as documented were addressed during this visit. Due to the added complexity in care, I will continue to support Jono in the subsequent management and with ongoing continuity of care.    BMI  Estimated body mass index is 26.76 kg/m  as calculated from the following:    Height as of this encounter: 1.803 m (5' 11\").    Weight as of this encounter: 87 kg (191 lb 14.4 oz).           Subjective   Jono is a 35 year old, presenting for the following health issues:  sweating, nausea, vomiting and Recheck Medication (Pt reports that " "he's experiencing some nausea, sweats, vomiting for 2-3 months. Pt also reports that he feels light headed and dizzy, unbalanced.)        7/15/2025     2:28 PM   Additional Questions   Roomed by susana   Accompanied by alone         7/15/2025     2:28 PM   Patient Reported Additional Medications   Patient reports taking the following new medications none     Patient presents with concerns of nausea, x1 episode of vomiting 1 1/2 months ago, and increased sweating.  Symptoms ongoing for 2 months  Was using cannabis -but stopped using it at least 1 month ago  Recently more nausea and sweating - vomiting not recurrent  Has lost some weight unintentionally- was in the 200's and today 191lbs.     Denies recent illness, fever, or diarrhea. Reports constipation - with bloody stools  In the last month x3-4 episodes or bloody stools with small clots  No family hx of autoimmune disease/colitis    For his mental health he is following with [facility]  On treatment receiving ketamine -last treatment about a week ago. Takes prn zofran as preventive for nausea while on ketamine.    ADHD, depression- meds managed by PCP    Hx of heroin and Kratom use.  On suboxine -  [place] recently last use a year ago  Heroin use over a year ago  [facility]      History of Present Illness       Reason for visit:  Possible MAURO  Symptom onset:  More than a month  Symptoms include:  Vomit and sweqting  Symptom intensity:  Moderate  Symptom progression:  Staying the same  Had these symptoms before:  Yes  Has tried/received treatment for these symptoms:  No   He is taking medications regularly.                      Objective    BP (!) 131/91 (BP Location: Left arm, Patient Position: Sitting, Cuff Size: Adult Regular)   Pulse 103   Temp 98.7  F (37.1  C) (Tympanic)   Resp 16   Ht 1.803 m (5' 11\")   Wt 87 kg (191 lb 14.4 oz)   SpO2 99%   BMI 26.76 kg/m    Body mass index is 26.76 kg/m .  Physical Exam   GENERAL: " alert and no distress  NECK: no adenopathy, no asymmetry, masses, or scars  RESP: lungs clear to auscultation - no rales, rhonchi or wheezes  CV: regular rate and rhythm, normal S1 S2, no S3 or S4, no murmur, click or rub, no peripheral edema  ABDOMEN: soft, nontender, no hepatosplenomegaly, no masses and bowel sounds normal  RECTAL (male): negative for external hemorrhoids or anal fissures  MS: no gross musculoskeletal defects noted, no edema  PSYCH: mentation appears normal, affect normal/bright            Signed Electronically by: DENY Navarrete CNP

## 2025-07-16 LAB
ALBUMIN SERPL BCG-MCNC: 4.9 G/DL (ref 3.5–5.2)
ALP SERPL-CCNC: 95 U/L (ref 40–150)
ALT SERPL W P-5'-P-CCNC: 109 U/L (ref 0–70)
ANION GAP SERPL CALCULATED.3IONS-SCNC: 13 MMOL/L (ref 7–15)
AST SERPL W P-5'-P-CCNC: 52 U/L (ref 0–45)
BILIRUB SERPL-MCNC: 0.3 MG/DL
BUN SERPL-MCNC: 10.7 MG/DL (ref 6–20)
CALCIUM SERPL-MCNC: 10.2 MG/DL (ref 8.8–10.4)
CHLORIDE SERPL-SCNC: 103 MMOL/L (ref 98–107)
CREAT SERPL-MCNC: 0.71 MG/DL (ref 0.67–1.17)
EGFRCR SERPLBLD CKD-EPI 2021: >90 ML/MIN/1.73M2
GLUCOSE SERPL-MCNC: 93 MG/DL (ref 70–99)
HBV SURFACE AG SERPL QL IA: NONREACTIVE
HCO3 SERPL-SCNC: 25 MMOL/L (ref 22–29)
HIV 1+2 AB+HIV1 P24 AG SERPL QL IA: NONREACTIVE
LIPASE SERPL-CCNC: 22 U/L (ref 13–60)
POTASSIUM SERPL-SCNC: 4.7 MMOL/L (ref 3.4–5.3)
PROT SERPL-MCNC: 7.6 G/DL (ref 6.4–8.3)
SODIUM SERPL-SCNC: 141 MMOL/L (ref 135–145)
TSH SERPL DL<=0.005 MIU/L-ACNC: 0.97 UIU/ML (ref 0.3–4.2)

## 2025-07-17 LAB
GAMMA INTERFERON BACKGROUND BLD IA-ACNC: 0.02 IU/ML
M TB IFN-G BLD-IMP: NEGATIVE
M TB IFN-G CD4+ BCKGRND COR BLD-ACNC: 9.98 IU/ML
MITOGEN IGNF BCKGRD COR BLD-ACNC: 0.01 IU/ML
MITOGEN IGNF BCKGRD COR BLD-ACNC: 0.01 IU/ML
QUANTIFERON MITOGEN: 10 IU/ML
QUANTIFERON NIL TUBE: 0.02 IU/ML
QUANTIFERON TB1 TUBE: 0.03 IU/ML
QUANTIFERON TB2 TUBE: 0.03

## 2025-07-31 ENCOUNTER — VIRTUAL VISIT (OUTPATIENT)
Dept: FAMILY MEDICINE | Facility: CLINIC | Age: 35
End: 2025-07-31
Payer: COMMERCIAL

## 2025-07-31 DIAGNOSIS — K59.00 CONSTIPATION, UNSPECIFIED CONSTIPATION TYPE: ICD-10-CM

## 2025-07-31 DIAGNOSIS — K62.5 BRBPR (BRIGHT RED BLOOD PER RECTUM): ICD-10-CM

## 2025-07-31 DIAGNOSIS — R61 SWEATING INCREASE: Primary | ICD-10-CM

## 2025-07-31 DIAGNOSIS — F90.9 ATTENTION DEFICIT HYPERACTIVITY DISORDER (ADHD), UNSPECIFIED ADHD TYPE: ICD-10-CM

## 2025-07-31 DIAGNOSIS — R74.8 ELEVATED LIVER ENZYMES: ICD-10-CM

## 2025-07-31 DIAGNOSIS — F33.1 MODERATE RECURRENT MAJOR DEPRESSION (H): ICD-10-CM

## 2025-07-31 RX ORDER — GUANFACINE 4 MG/1
4 TABLET, EXTENDED RELEASE ORAL AT BEDTIME
Qty: 90 TABLET | Refills: 3 | Status: SHIPPED | OUTPATIENT
Start: 2025-07-31

## 2025-07-31 RX ORDER — LISDEXAMFETAMINE DIMESYLATE 40 MG/1
40 CAPSULE ORAL EVERY MORNING
Qty: 30 CAPSULE | Refills: 0 | Status: SHIPPED | OUTPATIENT
Start: 2025-08-04

## 2025-07-31 NOTE — PROGRESS NOTES
Jono is a 35 year old who is being evaluated via a billable video visit.    What phone number would you like to be contacted at? [phone]  How would you like to obtain your AVS? Sharona De La Cruz was seen today for office visit.    Diagnoses and all orders for this visit:    Sweating increase  Comments:  Possible side effect of Vyvanse.  Prior labs reviewed, including negative HIV or TB, TSH WNL.  Trial Drysol  Consider decreasing Vyvanse or referral to Derm  Orders:  -     aluminum chloride (DRYSOL) 20 % external solution; Apply topically at bedtime.    Moderate recurrent major depression (H)  Comments:  Chronic, stable.  Refilled medication  Orders:  -     guanFACINE HCl (INTUNIV) 4 MG TB24; Take 1 tablet (4 mg) by mouth at bedtime.    BRBPR (bright red blood per rectum)  Constipation, unspecified constipation type  Comments:  Improved with conservative management.  Okay to hold off on colonoscopy for now    Elevated liver enzymes  Comments:  Noted per chart review.  Recheck fasting.  Orders:  -     Hepatic panel (Albumin, ALT, AST, Bili, Alk Phos, TP); Future    Attention deficit hyperactivity disorder (ADHD), unspecified ADHD type  Comments:  Chronic, controlled.  Continue current medication for now.  Orders:  -     lisdexamfetamine (VYVANSE) 40 MG capsule; Take 1 capsule (40 mg) by mouth every morning.      The longitudinal plan of care for the diagnosis(es)/condition(s) as documented were addressed during this visit. Due to the added complexity in care, I will continue to support Jono in the subsequent management and with ongoing continuity of care.        Subjective   Jono is a 35 year old, presenting for the following health issues:  office visit (possible MAURO)        7/31/2025     4:13 PM   Additional Questions   Roomed by    Accompanied by self     History of Present Illness    He is missing 1 dose(s) of medications per week.  He is not taking prescribed medications regularly due to remembering  to take.        Excessive sweating  Was seen 7/15/25 - nausea has resolved.     Blood per rectum  Improved with improved constipation      Objective    Vitals - Patient Reported  Systolic (Patient Reported): 127  Diastolic (Patient Reported): 77  Pulse (Patient Reported): 70        Physical Exam   GENERAL: alert and no distress  EYES: Eyes grossly normal to inspection.  No discharge or erythema, or obvious scleral/conjunctival abnormalities.  RESP: No audible wheeze, cough, or visible cyanosis.    SKIN: Visible skin clear. No significant rash, abnormal pigmentation or lesions.  NEURO: Cranial nerves grossly intact.  Mentation and speech appropriate for age.  PSYCH: Appropriate affect, tone, and pace of words          Video-Visit Details    Type of service:  Video Visit   Originating Location (pt. Location): Home    Distant Location (provider location):  On-site  Platform used for Video Visit: Urvashi  Signed Electronically by: Sandra Yap MD

## 2025-08-14 DIAGNOSIS — F90.9 ATTENTION DEFICIT HYPERACTIVITY DISORDER (ADHD), UNSPECIFIED ADHD TYPE: ICD-10-CM

## 2025-08-14 RX ORDER — DEXTROAMPHETAMINE SACCHARATE, AMPHETAMINE ASPARTATE, DEXTROAMPHETAMINE SULFATE AND AMPHETAMINE SULFATE 2.5; 2.5; 2.5; 2.5 MG/1; MG/1; MG/1; MG/1
10 TABLET ORAL DAILY
Qty: 30 TABLET | Refills: 0 | Status: SHIPPED | OUTPATIENT
Start: 2025-08-14

## 2025-09-04 DIAGNOSIS — I10 PRIMARY HYPERTENSION: ICD-10-CM

## 2025-09-04 RX ORDER — LISINOPRIL 5 MG/1
5 TABLET ORAL DAILY
Qty: 90 TABLET | Refills: 2 | Status: SHIPPED | OUTPATIENT
Start: 2025-09-04